# Patient Record
Sex: FEMALE | Race: WHITE | Employment: FULL TIME | URBAN - METROPOLITAN AREA
[De-identification: names, ages, dates, MRNs, and addresses within clinical notes are randomized per-mention and may not be internally consistent; named-entity substitution may affect disease eponyms.]

---

## 2018-08-29 ENCOUNTER — OFFICE VISIT (OUTPATIENT)
Dept: NEUROLOGY | Facility: CLINIC | Age: 53
End: 2018-08-29
Payer: COMMERCIAL

## 2018-08-29 VITALS
WEIGHT: 190 LBS | HEART RATE: 90 BPM | HEIGHT: 66 IN | SYSTOLIC BLOOD PRESSURE: 124 MMHG | BODY MASS INDEX: 30.53 KG/M2 | RESPIRATION RATE: 16 BRPM | DIASTOLIC BLOOD PRESSURE: 76 MMHG

## 2018-08-29 DIAGNOSIS — M51.9 LUMBAR DISC DISEASE: ICD-10-CM

## 2018-08-29 DIAGNOSIS — M48.061 SPINAL STENOSIS OF LUMBAR REGION WITHOUT NEUROGENIC CLAUDICATION: ICD-10-CM

## 2018-08-29 DIAGNOSIS — M54.16 LUMBAR RADICULOPATHY: Primary | ICD-10-CM

## 2018-08-29 DIAGNOSIS — M54.50 ACUTE BILATERAL LOW BACK PAIN WITHOUT SCIATICA: ICD-10-CM

## 2018-08-29 DIAGNOSIS — M51.26 LUMBAR HERNIATED DISC: ICD-10-CM

## 2018-08-29 PROCEDURE — 99214 OFFICE O/P EST MOD 30 MIN: CPT | Performed by: PHYSICAL MEDICINE & REHABILITATION

## 2018-08-29 RX ORDER — GLIPIZIDE 10 MG/1
20 TABLET, FILM COATED, EXTENDED RELEASE ORAL DAILY
COMMUNITY
End: 2019-01-21

## 2018-08-29 NOTE — PROGRESS NOTES
Low Back Pain H & P    Chief Complaint: Patient presents with:  Low Back Pain: Patient presents for low back pain, LOV: 6/23/16. Pt c/o low back pain and soreness for the past 4-6 months, pain worsens when walking up stairs.  Rated pain a 5/10, takes talia Main Topics   Smoking status: Former Smoker     Types: Cigarettes    Smokeless tobacco: Never Used    Comment: 30 years ago    Alcohol use Yes  0.0 oz/week     Comment: occ    Drug use: No    Sexual activity: Not on file     Other Topics Concern    Tim Trochanteric Bursa     Vascular lower extremity:   Dorsalis pedis pulse-RIGHT 2+   Dorsalis pedis pulse-LEFT 2+   Tibialis posterior pulse-RIGHT 2+   Tibialis posterior pulse-LEFT 2+     Neurological Lower Extremity:    Light Touch Sensation: Intact in miguel a

## 2018-08-29 NOTE — PATIENT INSTRUCTIONS
As of October 6th 2014, the Drug Enforcement Agency [facility]) is reclassifying all hydrocodone combination medications from Schedule III to Schedule II. This includes medications such as Norco, Vicodin, Lortab, Zohydro, and Vicoprofen.     What this means for y will get lumbar spine flexion and extension x-rays. She will start PT on her lumbar spine. The patient does not need any pain medications at this time. She will call me in ne month to let me know how she is doing.     If she is not getting better w

## 2018-09-07 ENCOUNTER — TELEPHONE (OUTPATIENT)
Dept: NEUROLOGY | Facility: CLINIC | Age: 53
End: 2018-09-07

## 2018-09-07 DIAGNOSIS — M54.50 ACUTE RIGHT-SIDED LOW BACK PAIN WITHOUT SCIATICA: ICD-10-CM

## 2018-09-07 DIAGNOSIS — M51.9 LUMBAR DISC DISEASE: ICD-10-CM

## 2018-09-07 DIAGNOSIS — M51.26 LUMBAR HERNIATED DISC: ICD-10-CM

## 2018-09-07 DIAGNOSIS — M48.061 SPINAL STENOSIS OF LUMBAR REGION WITHOUT NEUROGENIC CLAUDICATION: ICD-10-CM

## 2018-09-07 DIAGNOSIS — M54.16 LUMBAR RADICULOPATHY: Primary | ICD-10-CM

## 2018-09-07 NOTE — TELEPHONE ENCOUNTER
XR orders have been placed per LOV 8/29/18. Left detailed message for patient informing her orders have been placed and an appointment is not needed to complete the imaging.

## 2018-09-14 ENCOUNTER — OFFICE VISIT (OUTPATIENT)
Dept: PHYSICAL THERAPY | Facility: HOSPITAL | Age: 53
End: 2018-09-14
Attending: PHYSICAL MEDICINE & REHABILITATION
Payer: COMMERCIAL

## 2018-09-14 DIAGNOSIS — M48.061 SPINAL STENOSIS OF LUMBAR REGION WITHOUT NEUROGENIC CLAUDICATION: ICD-10-CM

## 2018-09-14 DIAGNOSIS — M51.26 LUMBAR HERNIATED DISC: ICD-10-CM

## 2018-09-14 DIAGNOSIS — M54.50 ACUTE BILATERAL LOW BACK PAIN WITHOUT SCIATICA: ICD-10-CM

## 2018-09-14 DIAGNOSIS — M51.9 LUMBAR DISC DISEASE: ICD-10-CM

## 2018-09-14 DIAGNOSIS — M54.16 LUMBAR RADICULOPATHY: ICD-10-CM

## 2018-09-14 PROCEDURE — 97140 MANUAL THERAPY 1/> REGIONS: CPT

## 2018-09-14 PROCEDURE — 97161 PT EVAL LOW COMPLEX 20 MIN: CPT

## 2018-09-14 NOTE — PROGRESS NOTES
LUMBAR SPINE EVALUATION:   Referring Physician: Dr. Dona Banda  Date of Onset: March 2018 Date of Service: 9/14/2018   Diagnosis: Lumbar radiculopathy (M54.16)  Acute bilateral low back pain without sciatica (M54.5)  Lumbar herniated disc (M51.26)  Lumbar di 50% limited          LBP on the way up   Extension WNL hinging L3-5    R Sidebend Limited 25% LBP   L Sidebend WNL    R Rotation WNL    L Rotation Limited 50% LBP   R Sideglide     L Sideglide       STRENGTH:   -/5     Right Left Comments   Hip Flexion (L2 excellent    Patient was advised of these findings, precautions, and treatment options and has agreed to actively participate in planning and for this course of care.     Thank you for your referral. Please co-sign or sign and return this letter via fax as

## 2018-09-21 ENCOUNTER — OFFICE VISIT (OUTPATIENT)
Dept: PHYSICAL THERAPY | Facility: HOSPITAL | Age: 53
End: 2018-09-21
Attending: PHYSICAL MEDICINE & REHABILITATION
Payer: COMMERCIAL

## 2018-09-21 PROCEDURE — 97110 THERAPEUTIC EXERCISES: CPT

## 2018-09-21 PROCEDURE — 97140 MANUAL THERAPY 1/> REGIONS: CPT

## 2018-09-21 NOTE — PROGRESS NOTES
Diagnosis: Lumbar radiculopathy (M54.16)  Acute bilateral low back pain without sciatica (M54.5)  Lumbar herniated disc (M51.26)  Lumbar disc disease (M51.9)  Spinal stenosis of lumbar region without neurogenic claudication (M48.061)  Authorized # of Visit closure of L SIJ. Skilled Services: manual therapy for spinal mobility, therex for core and proximal hip strength.   Charges: MT 1, EX 2       Total Timed Treatment: MT 15 min, EX 25 min  Total Treatment Time: 43 min

## 2018-09-25 ENCOUNTER — OFFICE VISIT (OUTPATIENT)
Dept: PHYSICAL THERAPY | Facility: HOSPITAL | Age: 53
End: 2018-09-25
Attending: PHYSICAL MEDICINE & REHABILITATION
Payer: COMMERCIAL

## 2018-09-25 PROCEDURE — 97110 THERAPEUTIC EXERCISES: CPT

## 2018-09-25 PROCEDURE — 97140 MANUAL THERAPY 1/> REGIONS: CPT

## 2018-09-25 NOTE — PROGRESS NOTES
Diagnosis: Lumbar radiculopathy (M54.16)  Acute bilateral low back pain without sciatica (M54.5)  Lumbar herniated disc (M51.26)  Lumbar disc disease (M51.9)  Spinal stenosis of lumbar region without neurogenic claudication (M48.061)  Authorized # of Visit able to bend FWD pain-free to pick objects off the ground to tidy up her home. Be able to demonstrate proper body mechanics.     Plan: Continue manual therapy to restore lumbosacral alignment and spinal mobility and incorporate core and proximal hip streng

## 2018-09-28 ENCOUNTER — OFFICE VISIT (OUTPATIENT)
Dept: PHYSICAL THERAPY | Facility: HOSPITAL | Age: 53
End: 2018-09-28
Attending: PHYSICAL MEDICINE & REHABILITATION
Payer: COMMERCIAL

## 2018-09-28 PROCEDURE — 97110 THERAPEUTIC EXERCISES: CPT

## 2018-09-28 PROCEDURE — 97140 MANUAL THERAPY 1/> REGIONS: CPT

## 2018-09-28 NOTE — PROGRESS NOTES
Diagnosis: Lumbar radiculopathy (M54.16)  Acute bilateral low back pain without sciatica (M54.5)  Lumbar herniated disc (M51.26)  Lumbar disc disease (M51.9)  Spinal stenosis of lumbar region without neurogenic claudication (M48.061)  Authorized # of Visit standing hip abd added. Assessment: Able to progress to prone hip ext with VCs to avoid hamstring dominance. No longer presented with SIJ dysfunction. Need to continue to build up posterior sling stability with glut strengthening.     Goals: 6 wks  Be a

## 2018-10-02 ENCOUNTER — OFFICE VISIT (OUTPATIENT)
Dept: PHYSICAL THERAPY | Facility: HOSPITAL | Age: 53
End: 2018-10-02
Attending: PHYSICAL MEDICINE & REHABILITATION
Payer: COMMERCIAL

## 2018-10-02 PROCEDURE — 97140 MANUAL THERAPY 1/> REGIONS: CPT

## 2018-10-02 PROCEDURE — 97110 THERAPEUTIC EXERCISES: CPT

## 2018-10-02 NOTE — PROGRESS NOTES
Diagnosis: Lumbar radiculopathy (M54.16)  Acute bilateral low back pain without sciatica (M54.5)  Lumbar herniated disc (M51.26)  Lumbar disc disease (M51.9)  Spinal stenosis of lumbar region without neurogenic claudication (M48.061)  Authorized # of Visit clams with TA brace bilat hold 5 sec x15  -EOB prone hip ext 2x10 hold 3 sec  -Standing hip abd YTB 3x10 bilat  -Tandem stance pull down GTB x15 hold 3 se   HEP - handout provided and created for open books, clams, and glut squeezes  HEP prone hip ext and

## 2018-10-05 ENCOUNTER — APPOINTMENT (OUTPATIENT)
Dept: PHYSICAL THERAPY | Facility: HOSPITAL | Age: 53
End: 2018-10-05
Attending: PHYSICAL MEDICINE & REHABILITATION
Payer: COMMERCIAL

## 2018-10-09 ENCOUNTER — OFFICE VISIT (OUTPATIENT)
Dept: PHYSICAL THERAPY | Facility: HOSPITAL | Age: 53
End: 2018-10-09
Attending: PHYSICAL MEDICINE & REHABILITATION
Payer: COMMERCIAL

## 2018-10-09 PROCEDURE — 97110 THERAPEUTIC EXERCISES: CPT

## 2018-10-09 PROCEDURE — 97140 MANUAL THERAPY 1/> REGIONS: CPT

## 2018-10-09 NOTE — PROGRESS NOTES
Diagnosis: Lumbar radiculopathy (M54.16)  Acute bilateral low back pain without sciatica (M54.5)  Lumbar herniated disc (M51.26)  Lumbar disc disease (M51.9)  Spinal stenosis of lumbar region without neurogenic claudication (M48.061)  Authorized # of Visit hold 5 sec  -open books x15 hold 3 sec  -S/L clams with TA brace bilat hold 5 sec x15  -prone hip ext 2x10 hold 3 sec  -Standing hip abd YTB 3x10 bilat EX  -PPT x10 hold 10 sec  -frog position PPT x15 hold 5 sec  -S/L clams with TA brace bilat hold 5 sec x

## 2018-10-12 ENCOUNTER — OFFICE VISIT (OUTPATIENT)
Dept: PHYSICAL THERAPY | Facility: HOSPITAL | Age: 53
End: 2018-10-12
Attending: PHYSICAL MEDICINE & REHABILITATION
Payer: COMMERCIAL

## 2018-10-12 PROCEDURE — 97140 MANUAL THERAPY 1/> REGIONS: CPT

## 2018-10-12 PROCEDURE — 97110 THERAPEUTIC EXERCISES: CPT

## 2018-10-12 NOTE — PROGRESS NOTES
Diagnosis: Lumbar radiculopathy (M54.16)  Acute bilateral low back pain without sciatica (M54.5)  Lumbar herniated disc (M51.26)  Lumbar disc disease (M51.9)  Spinal stenosis of lumbar region without neurogenic claudication (M48.061)  Authorized # of Visit hip abd YTB 3x10 bilat EX  -PPT x10 hold 10 sec  -frog position PPT x15 hold 5 sec  -open books x15 hold 3 sec  -S/L clams with TA brace bilat hold 5 sec x15  -prone hip ext 2x10 hold 3 sec  -Standing hip abd YTB 3x10 bilat EX  -PPT x10 hold 10 sec  -frog

## 2018-10-16 ENCOUNTER — OFFICE VISIT (OUTPATIENT)
Dept: PHYSICAL THERAPY | Facility: HOSPITAL | Age: 53
End: 2018-10-16
Attending: PHYSICAL MEDICINE & REHABILITATION
Payer: COMMERCIAL

## 2018-10-16 PROCEDURE — 97140 MANUAL THERAPY 1/> REGIONS: CPT

## 2018-10-16 PROCEDURE — 97110 THERAPEUTIC EXERCISES: CPT

## 2018-10-16 NOTE — PROGRESS NOTES
Diagnosis: Lumbar radiculopathy (M54.16)  Acute bilateral low back pain without sciatica (M54.5)  Lumbar herniated disc (M51.26)  Lumbar disc disease (M51.9)  Spinal stenosis of lumbar region without neurogenic claudication (M48.061)  Authorized # of Visit sec  -frog position PPT x15   -open books x15 hold 3 sec  -S/L clams with TA brace bilat hold 5 sec x15  -prone glut squeeze x15 hold 5 sec  -Standing hip abd YTB 3x10 bilat EX  -PPT x10 hold 10 sec  -frog position PPT x15 hold 5 sec  -open books x15 hold hip and core strengthening 1x/wk for 2 wks then DC. .   Skilled Services: manual therapy for spinal mobility, therex for core and proximal hip strength.   Charges: MT 1, EX 2       Total Timed Treatment: MT 15 min, EX 30 min  Total Treatment Time: 45 min

## 2018-10-23 ENCOUNTER — OFFICE VISIT (OUTPATIENT)
Dept: PHYSICAL THERAPY | Facility: HOSPITAL | Age: 53
End: 2018-10-23
Attending: PHYSICAL MEDICINE & REHABILITATION
Payer: COMMERCIAL

## 2018-10-23 PROCEDURE — 97110 THERAPEUTIC EXERCISES: CPT

## 2018-10-23 PROCEDURE — 97140 MANUAL THERAPY 1/> REGIONS: CPT

## 2018-10-23 NOTE — PROGRESS NOTES
Diagnosis: Lumbar radiculopathy (M54.16)  Acute bilateral low back pain without sciatica (M54.5)  Lumbar herniated disc (M51.26)  Lumbar disc disease (M51.9)  Spinal stenosis of lumbar region without neurogenic claudication (M48.061)  Authorized # of Visit L2-3  -Long axis traction SIJ manip R   EX  -PPT x10 hold 10 sec  -open books x15 hold 3 sec  -S/L clams with TA brace bilat hold 5 sec x15  -prone glut squeeze x15 hold 5 sec EX  -PPT x10 hold 10 sec  -frog position PPT x15   -open books x15 hold 3 sec  - progress to lat eccentric step down for additional functional quad and proximal hip stability for negotiating stairs.      Goals: 6 wks  Be able to negotiate stairs carrying laundry basket pain-free to get around home and work environment. (MET)  Be able to

## 2018-11-01 ENCOUNTER — TELEPHONE (OUTPATIENT)
Dept: PHYSICAL THERAPY | Facility: HOSPITAL | Age: 53
End: 2018-11-01

## 2019-01-21 ENCOUNTER — OFFICE VISIT (OUTPATIENT)
Dept: NEUROLOGY | Facility: CLINIC | Age: 54
End: 2019-01-21
Payer: COMMERCIAL

## 2019-01-21 VITALS — RESPIRATION RATE: 17 BRPM | SYSTOLIC BLOOD PRESSURE: 132 MMHG | DIASTOLIC BLOOD PRESSURE: 84 MMHG | HEART RATE: 94 BPM

## 2019-01-21 DIAGNOSIS — M51.26 LUMBAR HERNIATED DISC: ICD-10-CM

## 2019-01-21 DIAGNOSIS — M51.9 LUMBAR DISC DISEASE: ICD-10-CM

## 2019-01-21 DIAGNOSIS — M48.061 SPINAL STENOSIS OF LUMBAR REGION WITHOUT NEUROGENIC CLAUDICATION: ICD-10-CM

## 2019-01-21 DIAGNOSIS — M54.50 ACUTE RIGHT-SIDED LOW BACK PAIN WITHOUT SCIATICA: ICD-10-CM

## 2019-01-21 DIAGNOSIS — M54.16 LUMBAR RADICULOPATHY: Primary | ICD-10-CM

## 2019-01-21 PROCEDURE — 99214 OFFICE O/P EST MOD 30 MIN: CPT | Performed by: PHYSICAL MEDICINE & REHABILITATION

## 2019-01-21 RX ORDER — BLOOD SUGAR DIAGNOSTIC
STRIP MISCELLANEOUS
COMMUNITY
Start: 2018-10-02

## 2019-01-21 RX ORDER — BLOOD-GLUCOSE METER
EACH MISCELLANEOUS
COMMUNITY
Start: 2018-09-05

## 2019-01-21 RX ORDER — LANCETS 33 GAUGE
EACH MISCELLANEOUS
COMMUNITY
Start: 2018-09-05

## 2019-01-21 RX ORDER — INSULIN GLARGINE 300 U/ML
INJECTION, SOLUTION SUBCUTANEOUS
COMMUNITY
Start: 2018-10-02

## 2019-01-21 NOTE — PROGRESS NOTES
Low Back Pain H & P    Chief Complaint: Patient presents with:  Low Back Pain: LOV 8/29/18 Pt states that she did have PT and it was successful.  she states that walking up the stairs makes it worse and she has a new pain that she thought was worth getting Not on file      Food insecurity - worry: Not on file      Food insecurity - inability: Not on file      Transportation needs - medical: Not on file      Transportation needs - non-medical: Not on file    Occupational History      Not on file    Tobacco Us all Spinous Processes   Z-joints: Non-tender for all Z-joints   SIJ: Non-tender for bilateral SIJ   Piriformis Muscle: Non-tender bilateral Piriformis muscles   Greater Trochanteric Bursa: Non-tender for bilateral Greater Trochanteric Bursa     Vascular lo

## 2019-01-21 NOTE — PATIENT INSTRUCTIONS
Plan  She will resume her home exercise program.    The patient does not need any injections at this time. If the home exercise program is not helping her after 2-3 weeks, then she will need to get back into the PT.     The patient does not need any pa

## 2019-07-01 ENCOUNTER — HOSPITAL ENCOUNTER (OUTPATIENT)
Dept: GENERAL RADIOLOGY | Facility: HOSPITAL | Age: 54
Discharge: HOME | End: 2019-07-01
Attending: ORTHOPAEDIC SURGERY
Payer: COMMERCIAL

## 2019-07-01 ENCOUNTER — HOSPITAL ENCOUNTER (OUTPATIENT)
Dept: MRI IMAGING | Facility: HOSPITAL | Age: 54
Discharge: HOME | End: 2019-07-01
Attending: ORTHOPAEDIC SURGERY
Payer: COMMERCIAL

## 2019-07-01 DIAGNOSIS — M87.9 BONE NECROSIS (HCC): ICD-10-CM

## 2019-07-01 DIAGNOSIS — L03.90 CELLULITIS: ICD-10-CM

## 2019-07-01 DIAGNOSIS — E11.621 CHRONIC DIABETIC ULCER OF LEFT FOOT DETERMINED BY EXAMINATION (HCC): ICD-10-CM

## 2019-07-01 DIAGNOSIS — L97.529 CHRONIC DIABETIC ULCER OF LEFT FOOT DETERMINED BY EXAMINATION (HCC): ICD-10-CM

## 2019-07-01 PROCEDURE — 73718 MRI LOWER EXTREMITY W/O DYE: CPT | Performed by: ORTHOPAEDIC SURGERY

## 2019-07-01 PROCEDURE — 73630 X-RAY EXAM OF FOOT: CPT | Performed by: ORTHOPAEDIC SURGERY

## 2019-08-30 ENCOUNTER — HOSPITAL ENCOUNTER (OUTPATIENT)
Dept: ULTRASOUND IMAGING | Facility: HOSPITAL | Age: 54
Discharge: HOME | End: 2019-08-30
Attending: FAMILY MEDICINE
Payer: COMMERCIAL

## 2019-08-30 DIAGNOSIS — E11.621 DIABETIC ULCER OF LEFT FOOT (HCC): ICD-10-CM

## 2019-08-30 DIAGNOSIS — L97.529 DIABETIC ULCER OF LEFT FOOT (HCC): ICD-10-CM

## 2019-08-30 PROCEDURE — 93923 UPR/LXTR ART STDY 3+ LVLS: CPT | Performed by: FAMILY MEDICINE

## 2022-09-22 ENCOUNTER — OFFICE VISIT (OUTPATIENT)
Dept: PHYSICAL MEDICINE AND REHAB | Facility: CLINIC | Age: 57
End: 2022-09-22

## 2022-09-22 VITALS
SYSTOLIC BLOOD PRESSURE: 106 MMHG | HEART RATE: 109 BPM | WEIGHT: 208 LBS | RESPIRATION RATE: 16 BRPM | DIASTOLIC BLOOD PRESSURE: 72 MMHG | OXYGEN SATURATION: 100 % | HEIGHT: 66 IN | BODY MASS INDEX: 33.43 KG/M2

## 2022-09-22 DIAGNOSIS — G89.29 CHRONIC PAIN OF RIGHT KNEE: ICD-10-CM

## 2022-09-22 DIAGNOSIS — M54.16 LUMBAR RADICULOPATHY: Primary | ICD-10-CM

## 2022-09-22 DIAGNOSIS — M48.061 SPINAL STENOSIS OF LUMBAR REGION WITHOUT NEUROGENIC CLAUDICATION: ICD-10-CM

## 2022-09-22 DIAGNOSIS — M51.24 HERNIATED THORACIC DISC WITHOUT MYELOPATHY: ICD-10-CM

## 2022-09-22 DIAGNOSIS — M51.26 LUMBAR HERNIATED DISC: ICD-10-CM

## 2022-09-22 DIAGNOSIS — M25.561 CHRONIC PAIN OF RIGHT KNEE: ICD-10-CM

## 2022-09-22 DIAGNOSIS — M51.9 LUMBAR DISC DISEASE: ICD-10-CM

## 2022-09-22 DIAGNOSIS — M41.05 INFANTILE IDIOPATHIC SCOLIOSIS OF THORACOLUMBAR REGION: ICD-10-CM

## 2022-09-22 PROCEDURE — 99204 OFFICE O/P NEW MOD 45 MIN: CPT | Performed by: PHYSICAL MEDICINE & REHABILITATION

## 2022-09-22 RX ORDER — DILTIAZEM HYDROCHLORIDE 240 MG/1
240 CAPSULE, EXTENDED RELEASE ORAL NIGHTLY
COMMUNITY
Start: 2022-08-20

## 2022-09-22 RX ORDER — DAPAGLIFLOZIN 5 MG/1
TABLET, FILM COATED ORAL
COMMUNITY
Start: 2022-09-20

## 2022-09-22 RX ORDER — ATORVASTATIN CALCIUM 40 MG/1
40 TABLET, FILM COATED ORAL DAILY
COMMUNITY
Start: 2022-08-04

## 2022-09-22 RX ORDER — PEN NEEDLE, DIABETIC 32GX 5/32"
NEEDLE, DISPOSABLE MISCELLANEOUS
COMMUNITY
Start: 2022-09-06

## 2022-09-22 RX ORDER — LISINOPRIL 20 MG/1
TABLET ORAL
COMMUNITY
Start: 2022-08-03 | End: 2022-09-22 | Stop reason: DRUGHIGH

## 2022-09-22 NOTE — PATIENT INSTRUCTIONS
Plan  She will get into the PT for the lumbar spine and the right knee. I will hold on getting a right knee x-ray at this time. If the knee does not improve with the PT, then she will need to have one at that time. She will follow up in 3 months or sooner if needed.

## 2022-10-28 ENCOUNTER — TELEPHONE (OUTPATIENT)
Dept: PHYSICAL THERAPY | Facility: HOSPITAL | Age: 57
End: 2022-10-28

## 2022-11-07 ENCOUNTER — TELEPHONE (OUTPATIENT)
Dept: PHYSICAL THERAPY | Facility: HOSPITAL | Age: 57
End: 2022-11-07

## 2022-11-08 ENCOUNTER — APPOINTMENT (OUTPATIENT)
Dept: PHYSICAL THERAPY | Facility: HOSPITAL | Age: 57
End: 2022-11-08
Attending: PHYSICAL MEDICINE & REHABILITATION
Payer: COMMERCIAL

## 2022-11-15 ENCOUNTER — OFFICE VISIT (OUTPATIENT)
Dept: PHYSICAL THERAPY | Facility: HOSPITAL | Age: 57
End: 2022-11-15
Attending: PHYSICAL MEDICINE & REHABILITATION
Payer: COMMERCIAL

## 2022-11-15 PROCEDURE — 97110 THERAPEUTIC EXERCISES: CPT

## 2022-11-15 PROCEDURE — 97161 PT EVAL LOW COMPLEX 20 MIN: CPT

## 2022-11-18 ENCOUNTER — APPOINTMENT (OUTPATIENT)
Dept: PHYSICAL THERAPY | Facility: HOSPITAL | Age: 57
End: 2022-11-18
Attending: PHYSICAL MEDICINE & REHABILITATION
Payer: COMMERCIAL

## 2022-11-25 ENCOUNTER — OFFICE VISIT (OUTPATIENT)
Dept: PHYSICAL THERAPY | Facility: HOSPITAL | Age: 57
End: 2022-11-25
Attending: PHYSICAL MEDICINE & REHABILITATION
Payer: COMMERCIAL

## 2022-11-25 PROCEDURE — 97110 THERAPEUTIC EXERCISES: CPT

## 2022-11-25 PROCEDURE — 97112 NEUROMUSCULAR REEDUCATION: CPT

## 2022-11-25 NOTE — PROGRESS NOTES
Sukumar Spann    3/19/1965  Referring Physician:  Buck Blair  Diagnosis: Lumbar radiculopathy (M54.16)  Spinal stenosis of lumbar region without neurogenic claudication (M48.061)  Lumbar disc disease (M51.9)  Lumbar herniated disc (M51.26)  Herniated thoracic disc without myelopathy (M51.24)  Infantile idiopathic scoliosis of thoracolumbar region (M41.05)  Chronic pain of right knee (M25.561,G89.29)    Initial Evaluation Date: 11/15/2022  Date of Surgery: None for this diagnosis    Insurance type and authorized visits: Serena IRENE NA  Plan of care expires: For orders: 2/13/2023; For insurance: 9/22/2023    Precautions/Hx: DM, HTN, HLD    SUBJECTIVE:     Pt reports that she felt straighter after she left last visit and had less pain. She then felt well enough to do gardening and then was sore afterwards. Visit count 2/13/2023 1/8 2/8    Date 11/15/2022 11/25/2022     LBP/ R>L      Pain Range 0-1 to 5/10 0-1 to 4-5/10    Pain Ave 1-2/10 0-1/10       OBJECTIVE:     Treatment performed this date:    Therapeutic Exercise:  Visit #   1/8 2/8   Position Exercise HEP 11/15/2022 11/25/2022. Supine SKC H X X    DKC H X X    LTR H X X    Sciatic nerve glide H X X    Thoracic decompression H  X    T decompress chin tuck H  X    T decomprs scap retract H  X    T decomprs shldr horiz  H  X    T decomprs B shldr flex H  X   Sitting Leg pump nerve glide H X    Neuro Re-ed   X see assessment     Posture       TNE   X beginning level - pacing of activities   H = HEP. Pt given copies of this exercise for home program.  X = Exercises done this date - pt verbalized understanding and demonstrated competence. All exercises done B unless otherwise indicated. Pt advised to discontinue exercises that increase pain and to call or return to therapist to discuss.   Each intervention above is specifically prescribed to address the patients identified impairments, activity limitations, and participation restrictions. ASSESSMENT:     Pt doing well with decreased c/o pain and increased tolerance for activity. Pt educated on mobility and postural deficits w review of imaging to reinforce corrective exercises and positional awareness. Pt educated on disc anatomy and behavior with use of discussion, diagrams, models and demonstration of positioning for neutral control. Pt asked clarifying questions and verbalized understanding of concepts and application. Pt educated on stenosis concepts w review of central canal, foramina, facet arthropathy, ligamentum flavum, disc anatomy and behavior. Pt verbalized understanding. Pt educated on beginning level of therapeutic neuroscience w discussion of importance of pacing of activities especially during healing phase. Pt demonstrates all previously instructed exercises with proficiency. Pt improved w cueing for correction of forward head position w increased kyphosis-lordosis during T decompression. Physical Therapy Goals: From 11/15/2022 to 2/13/2023  - Created with patient input during initial evaluation   1. Pt will be independent in beginning level of HEP for stretching, posture and strengthening. 2. Pt will be able to get in and out of the tub without increased symptoms. 3. Pt will be able to walk for exercise 15 min x 2 without increased symptoms. 4. Pt will be able to stand to have a conversation 10 min without increased symptoms. 5. Pt will be able to lift 25# without increased symptoms. PLAN OF CARE:      Continue PT per original plan for therapeutic exercises, posture retraining, therapeutic activities, manual treatment, neuromuscular reeducation, therapeutic pain neuroscience education, patient education, self care home management, home exercise program, and modalities as needed.     Charges: Lauren Marito   Total Timed treatment: 60 min      Total Treatment Time: 60 min __________________________________________________________________________________________      21st Century Cures Act Notice to Patient: Medical documents like this are made available to patients in the interest of transparency. However, be advised this is a medical document and it is intended as [id number]-qs-faox communication between your medical providers. This medical document may contain abbreviations, assessments, medical data, and results or other terms that are unfamiliar. Medical documents are intended to carry relevant information, facts as evident, and the clinical opinion of the practitioner. As such, this medical document may be written in language that appears blunt or direct. You are encouraged to contact your medical provider and/or [facility] Patient Experience if you have any questions about this medical document.   Objective Initial Evaluation Data 11/15/2022:    Functional Scores 11/15/2022   FOTO primary measure 57       Posture 11/15/2022   Alignment L shoulder elevated, L trunk shift, increased thoracic kyphosis, pendulous abdomen, forward head position, apparent scoliosis; min B genu recurvatum       Body mechanics 11/15/2022   Reaching for personal belongings fair       Sensation 11/15/2022   Dermatomes Light Touch    Proximal medial thigh R (L2)  wnl   Proximal medial thigh L (L2) wnl   Above knee R (L3) wnl   Above knee L (L3) wnl   Medial arch R(L4) wnl   Medial arch L (L4) wnl   Between 1st - 2nd toes R (L5) wnl   Between 1st - 2nd toes L (L5) wnl   Lateral border of foot R (S1) wnl   Lateral border of foot L (S1) wnl   Popliteal fossa R (S2) wnl   Popliteal fossa L(S2) wnl       Lumbopelvic 11/15/2022   Control  Mod loss        ROM Lumbar 11/15/2022   Flexion Min-mod   Extension Wnl, all lower lumbar, mod-max loss T   R SB Min, all lower T   L SB Min, all lower T   R Rotation min   L Rotation min   * pain limiting    ROM Hip 11/15/2022   Flex R 125 117   Flex L 125 115   ER R 45 45   ER L 45 45   IR R 40 0   IR L 40 7   *pain limiting     MMT 5/5 11/15/2022   L2- hip flex R 4-   Hip flex L 4-   L3- knee ext R 5   Knee ext L 4   L4- ankle DF R 4   Ankle DF L 4   L5- EHL R 4   EHL L 3   S1- ankle PF R 4   Ankle PF L 4   S2- Hams R 4+   Hams L 5-   *pain limiting    LE flexibility 11/15/2022   Piriformis L wnl   Piriformis R wnl   Hams L -27   Hams R -20   *pain limiting    Neural mobility 11/15/2022   SLR R 65 myofascial tension only   SLR L 45 myofascial tension only      JUJU 11/15/2022   R mod   L Min-mod

## 2022-11-29 ENCOUNTER — OFFICE VISIT (OUTPATIENT)
Dept: PHYSICAL THERAPY | Facility: HOSPITAL | Age: 57
End: 2022-11-29
Attending: PHYSICAL MEDICINE & REHABILITATION
Payer: COMMERCIAL

## 2022-11-29 PROCEDURE — 97110 THERAPEUTIC EXERCISES: CPT

## 2022-11-29 PROCEDURE — 97112 NEUROMUSCULAR REEDUCATION: CPT

## 2022-11-29 PROCEDURE — 97140 MANUAL THERAPY 1/> REGIONS: CPT

## 2022-11-29 NOTE — PROGRESS NOTES
Krunal Brandt    3/19/1965  Referring Physician:  Oly Blair  Diagnosis: Lumbar radiculopathy (M54.16)  Spinal stenosis of lum'bar region without neurogenic claudication (M48.061)  Lumbar disc disease (M51.9)  Lumbar herniated disc (M51.26)  Herniated thoracic disc without myelopathy (M51.24)  Infantile idiopathic scoliosis of thoracolumbar region (M41.05)  Chronic pain of right knee (M25.561,G89.29)    Initial Evaluation Date: 11/15/2022  Date of Surgery: None for this diagnosis    Insurance type and authorized visits: JAMISON Armstrong  Plan of care expires: For orders: 2/13/2023; For insurance: 9/22/2023    Precautions/Hx: DM, HTN, HLD    SUBJECTIVE:     Pt reports that she is feeling less tight. Visit count 2/13/2023 1/8 2/8 3/8   Date 11/15/2022 11/25/2022  11/29/2022    LBP/ R>L      Pain Range 0-1 to 5/10 0-1 to 4-5/10 0-1 to 4-5/10   Pain Ave 1-2/10 0-1/10 0-1/10      OBJECTIVE:     Treatment performed this date:    Therapeutic Exercise:  Visit #   1/8 2/8 3/8   Position Exercise HEP 11/15/2022 11/25/2022.  11/29/2022    Supine SKC H X X     DKC H X X     LTR H X X     Sciatic nerve glide H X X     Thoracic decompression H  X     T decompress chin tuck H  X     T decomprs scap retract H  X     T decomprs shldr horiz  H  X     T decomprs B shldr flex H  X     TA set w exhale    X           Foam roll Balancing     X    Chin tuck axial elongation    X    Horiz abd w median nerve glide    X    B shldr abd josefina wings    X    B shldr flex axial elongation    X   Sitting Leg pump nerve glide H X      TA set w exhale H   X    Neuro Re-ed   X see assessment  X see assessment     Posture        TNE   X beginning level - pacing of activities    Man Tx Brachial chain -  upper and lower, B combo upper/lower w hold and soft diaphragmatic breathing     X   H = HEP. Pt given copies of this exercise for home program.  X = Exercises done this date - pt verbalized understanding and demonstrated competence. All exercises done B unless otherwise indicated. Pt advised to discontinue exercises that increase pain and to call or return to therapist to discuss. Each intervention above is specifically prescribed to address the patients identified impairments, activity limitations, and participation restrictions. ASSESSMENT:     Pt continues to do well with low level of pain and good tolerance for PT and HEP thus far. Pt demonstrating improved postural awareness and control in sitting posture. Pt now able to lie flat and on foam roll without support under head. Pt demonstrates mod restriction of L lower rib and min-mod of other quadrants. Pt displays general lower muscle tone globally. Discussed development of awareness of postural neutral w tonic muscle tone to hold for function. Pt able to progress lumbopelvic control awareness well with min V/T cues and self palp. Pt tolerate brachial chain mobilization well demonstrating min-mod losses in mobility. Physical Therapy Goals: From 11/15/2022 to 2/13/2023  - Created with patient input during initial evaluation   1. Pt will be independent in beginning level of HEP for stretching, posture and strengthening. 2. Pt will be able to get in and out of the tub without increased symptoms. 3. Pt will be able to walk for exercise 15 min x 2 without increased symptoms. 4. Pt will be able to stand to have a conversation 10 min without increased symptoms. 5. Pt will be able to lift 25# without increased symptoms. PLAN OF CARE:      Continue PT per original plan for therapeutic exercises, posture retraining, therapeutic activities, manual treatment, neuromuscular reeducation, therapeutic pain neuroscience education, patient education, self care home management, home exercise program, and modalities as needed.     Charges: TE1, Neuro1, MT1   Total Timed treatment: 40 min      Total Treatment Time: 40 min __________________________________________________________________________________________      21st Century Cures Act Notice to Patient: Medical documents like this are made available to patients in the interest of transparency. However, be advised this is a medical document and it is intended as [id number] communication between your medical providers. This medical document may contain abbreviations, assessments, medical data, and results or other terms that are unfamiliar. Medical documents are intended to carry relevant information, facts as evident, and the clinical opinion of the practitioner. As such, this medical document may be written in language that appears blunt or direct. You are encouraged to contact your medical provider and/or [facility] Patient Experience if you have any questions about this medical document.   Objective Initial Evaluation Data 11/15/2022:    Functional Scores 11/15/2022   FOTO primary measure 57       Posture 11/15/2022   Alignment L shoulder elevated, L trunk shift, increased thoracic kyphosis, pendulous abdomen, forward head position, apparent scoliosis; min B genu recurvatum       Body mechanics 11/15/2022   Reaching for personal belongings fair       Sensation 11/15/2022   Dermatomes Light Touch    Proximal medial thigh R (L2)  wnl   Proximal medial thigh L (L2) wnl   Above knee R (L3) wnl   Above knee L (L3) wnl   Medial arch R(L4) wnl   Medial arch L (L4) wnl   Between 1st - 2nd toes R (L5) wnl   Between 1st - 2nd toes L (L5) wnl   Lateral border of foot R (S1) wnl   Lateral border of foot L (S1) wnl   Popliteal fossa R (S2) wnl   Popliteal fossa L(S2) wnl       Lumbopelvic 11/15/2022   Control  Mod loss        ROM Lumbar 11/15/2022   Flexion Min-mod   Extension Wnl, all lower lumbar, mod-max loss T   R SB Min, all lower T   L SB Min, all lower T   R Rotation min   L Rotation min   * pain limiting    ROM Hip 11/15/2022   Flex R 125 117   Flex L 125 115   ER R 45 45   ER L 45 45   IR R 40 0   IR L 40 7   *pain limiting     MMT 5/5 11/15/2022   L2- hip flex R 4-   Hip flex L 4-   L3- knee ext R 5   Knee ext L 4   L4- ankle DF R 4   Ankle DF L 4   L5- EHL R 4   EHL L 3   S1- ankle PF R 4   Ankle PF L 4   S2- Hams R 4+   Hams L 5-   *pain limiting    LE flexibility 11/15/2022   Piriformis L wnl   Piriformis R wnl   Hams L -27   Hams R -20   *pain limiting    Neural mobility 11/15/2022   SLR R 65 myofascial tension only   SLR L 45 myofascial tension only      JUJU 11/15/2022   R mod   L Min-mod

## 2022-12-01 ENCOUNTER — APPOINTMENT (OUTPATIENT)
Dept: PHYSICAL THERAPY | Facility: HOSPITAL | Age: 57
End: 2022-12-01
Attending: PHYSICAL MEDICINE & REHABILITATION
Payer: COMMERCIAL

## 2022-12-06 ENCOUNTER — OFFICE VISIT (OUTPATIENT)
Dept: PHYSICAL THERAPY | Facility: HOSPITAL | Age: 57
End: 2022-12-06
Attending: PHYSICAL MEDICINE & REHABILITATION
Payer: COMMERCIAL

## 2022-12-06 PROCEDURE — 97110 THERAPEUTIC EXERCISES: CPT

## 2022-12-06 PROCEDURE — 97112 NEUROMUSCULAR REEDUCATION: CPT

## 2022-12-06 NOTE — PROGRESS NOTES
Author Bhardwaj    3/19/1965  Referring Physician:  Soumya Blair  Diagnosis: Lumbar radiculopathy (M54.16)  Spinal stenosis of lumbar region without neurogenic claudication (M48.061)  Lumbar disc disease (M51.9)  Lumbar herniated disc (M51.26)  Herniated thoracic disc without myelopathy (M51.24)  Infantile idiopathic scoliosis of thoracolumbar region (M41.05)  Chronic pain of right knee (M25.561,G89.29)    Initial Evaluation Date: 11/15/2022  Date of Surgery: None for this diagnosis    Insurance type and authorized visits: Anastacio IRENE, NA  Plan of care expires: For orders: 2/13/2023; For insurance: 9/22/2023    Precautions/Hx: DM, HTN, HLD    SUBJECTIVE:     Pt reports that she was able to move 70 boxes for her 's dialysis and felt that she was able to manage her back pain well with her exercises.      Visit count 2/13/2023 1/8 2/8 3/8 4/8   Date 11/15/2022 11/25/2022  11/29/2022  12/6/2022    LBP/ R>L       Pain Range 0-1 to 5/10 0-1 to 4-5/10 0-1 to 4-5/10 0-1 to 4/10   Pain Ave 1-2/10 0-1/10 0-1/10 0-1/10      OBJECTIVE:     Treatment performed this date:    Therapeutic Exercise:  Visit #   2/8 3/8 4/8   Position Exercise HEP 11/25/2022.  11/29/2022 12/6/2022    Supine SKC H X      DKC H X      LTR H X      Sciatic nerve glide H X      Thoracic decompression H X      T decompress chin tuck H X      T decomprs scap retract H X      T decomprs shldr horiz  H X      T decomprs B shldr flex H X      TA set w exhale   X     Pelvic tilt    X    CHON T8 extn TA exhale ribs down H   X   Foam roll Balancing    X X    Chin tuck axial elongation   X X    Horiz abd w median nerve glide   X X    B shldr abd josefina wings   X X    B shldr flex axial elongation   X X    TA set exhale ribs down    X   Sitting Leg pump nerve glide H       TA set w exhale H  X     Neuro Re-ed  X see assessment  X see assessment  X see assessment     Posture        TNE  X beginning level - pacing of activities     Man Tx Brachial chain -  upper and lower, B combo upper/lower w hold and soft diaphragmatic breathing    X    H = HEP. Pt given copies of this exercise for home program.  X = Exercises done this date - pt verbalized understanding and demonstrated competence. All exercises done B unless otherwise indicated. Pt advised to discontinue exercises that increase pain and to call or return to therapist to discuss. Each intervention above is specifically prescribed to address the patients identified impairments, activity limitations, and participation restrictions. ASSESSMENT:     Pt continues to have decreased pain with good function. Pt educated on importance of coordination of core activation w breath control. Viewed chest wall flare positioning in mirror while on foam roll. Discussed gaining awareness of abdominal activation without substitution of thoracic flexion. Pt educated on the importance of correct diaphragm control and positioning to gain core abdominal control and neutral lumbopelvic and cervicothoracic alignment. Pt verbalized understanding. Pt did well to progress lumbopelvic control w breath coordination. Physical Therapy Goals: From 11/15/2022 to 2/13/2023  - Created with patient input during initial evaluation   1. Pt will be independent in beginning level of HEP for stretching, posture and strengthening. 2. Pt will be able to get in and out of the tub without increased symptoms. 3. Pt will be able to walk for exercise 15 min x 2 without increased symptoms. 4. Pt will be able to stand to have a conversation 10 min without increased symptoms. 5. Pt will be able to lift 25# without increased symptoms. PLAN OF CARE:      Assess response to beginning level abdominal activation w breath control.   Continue PT per original plan for therapeutic exercises, posture retraining, therapeutic activities, manual treatment, neuromuscular reeducation, therapeutic pain neuroscience education, patient education, self care home management, home exercise program, and modalities as needed. Charges: TE2, Neuro1   Total Timed treatment: 40 min      Total Treatment Time: 40 min     __________________________________________________________________________________________      21st Century Cures Act Notice to Patient: Medical documents like this are made available to patients in the interest of transparency. However, be advised this is a medical document and it is intended as vfld-uv-fisu communication between your medical providers. This medical document may contain abbreviations, assessments, medical data, and results or other terms that are unfamiliar. Medical documents are intended to carry relevant information, facts as evident, and the clinical opinion of the practitioner. As such, this medical document may be written in language that appears blunt or direct. You are encouraged to contact your medical provider and/or [facility] Patient Experience if you have any questions about this medical document.   Objective Initial Evaluation Data 11/15/2022:    Functional Scores 11/15/2022   FOTO primary measure 57       Posture 11/15/2022   Alignment L shoulder elevated, L trunk shift, increased thoracic kyphosis, pendulous abdomen, forward head position, apparent scoliosis; min B genu recurvatum       Body mechanics 11/15/2022   Reaching for personal belongings fair       Sensation 11/15/2022   Dermatomes Light Touch    Proximal medial thigh R (L2)  wnl   Proximal medial thigh L (L2) wnl   Above knee R (L3) wnl   Above knee L (L3) wnl   Medial arch R(L4) wnl   Medial arch L (L4) wnl   Between 1st - 2nd toes R (L5) wnl   Between 1st - 2nd toes L (L5) wnl   Lateral border of foot R (S1) wnl   Lateral border of foot L (S1) wnl   Popliteal fossa R (S2) wnl   Popliteal fossa L(S2) wnl       Lumbopelvic 11/15/2022   Control  Mod loss        ROM Lumbar 11/15/2022   Flexion Min-mod   Extension Wnl, all lower lumbar, mod-max loss T   R SB Min, all lower T   L SB Min, all lower T   R Rotation min   L Rotation min   * pain limiting    ROM Hip 11/15/2022   Flex R 125 117   Flex L 125 115   ER R 45 45   ER L 45 45   IR R 40 0   IR L 40 7   *pain limiting     MMT 5/5 11/15/2022   L2- hip flex R 4-   Hip flex L 4-   L3- knee ext R 5   Knee ext L 4   L4- ankle DF R 4   Ankle DF L 4   L5- EHL R 4   EHL L 3   S1- ankle PF R 4   Ankle PF L 4   S2- Hams R 4+   Hams L 5-   *pain limiting    LE flexibility 11/15/2022   Piriformis L wnl   Piriformis R wnl   Hams L -27   Hams R -20   *pain limiting    Neural mobility 11/15/2022   SLR R 65 myofascial tension only   SLR L 45 myofascial tension only      JUJU 11/15/2022   R mod   L Min-mod

## 2022-12-15 ENCOUNTER — OFFICE VISIT (OUTPATIENT)
Dept: PHYSICAL THERAPY | Facility: HOSPITAL | Age: 57
End: 2022-12-15
Attending: PHYSICAL MEDICINE & REHABILITATION
Payer: COMMERCIAL

## 2022-12-15 PROCEDURE — 97112 NEUROMUSCULAR REEDUCATION: CPT

## 2022-12-15 PROCEDURE — 97110 THERAPEUTIC EXERCISES: CPT

## 2022-12-15 NOTE — PROGRESS NOTES
Dana Reynolds    3/19/1965  Referring Physician:  Kristen Blair  Diagnosis: Lumbar radiculopathy (M54.16)  Spinal stenosis of lumbar region without neurogenic claudication (M48.061)  Lumbar disc disease (M51.9)  Lumbar herniated disc (M51.26)  Herniated thoracic disc without myelopathy (M51.24)  Infantile idiopathic scoliosis of thoracolumbar region (M41.05)  Chronic pain of right knee (M25.561,G89.29)    Initial Evaluation Date: 11/15/2022  Date of Surgery: None for this diagnosis    Insurance type and authorized visits: David IRENE, NA  Plan of care expires: For orders: 2/13/2023; For insurance: 9/22/2023    Precautions/Hx: DM, HTN, HLD    SUBJECTIVE:     Pt reports that she tripped and feel on her L knee where she has had a meniscal tear. She also hit her L elbow and cheek. She has recovered well after a few days of soreness. She had a mild increase in her back pain that she was able to resolve with her HEP. She is also back to work full-time.      Visit count 2/13/2023 1/8 2/8 3/8 4/8 5/8   Date 11/15/2022 11/25/2022  11/29/2022  12/6/2022  12/15/2022    LBP/ R>L        Pain Range 0-1 to 5/10 0-1 to 4-5/10 0-1 to 4-5/10 0-1 to 4/10 0-1 to 5 /10   Pain Ave 1-2/10 0-1/10 0-1/10 0-1/10 1-2/10      OBJECTIVE:     Treatment performed this date:    Therapeutic Exercise:  Visit #   3/8 4/8 5/8   Position Exercise HEP 11/29/2022  12/6/2022  12/15/2022    Supine SKC H       DKC H       LTR H       Sciatic nerve glide H       Thoracic decompression H       T decompress chin tuck H       T decomprs scap retract H       T decomprs shldr horiz  H       T decomprs B shldr flex H       TA set w exhale  X  X     Pelvic tilt   X     P tilt TA set exhale ball hip add    X 10x2 heavy cues    CHON T8 extn TA exhale ribs down H  X X 10x   Foam roll Balancing   X X     Chin tuck axial elongation  X X     Horiz abd w median nerve glide  X X     B shldr abd josefina wings  X X     B shldr flex axial elongation  X X     TA set exhale ribs down   X    Sitting Leg pump nerve glide H   x    TA set w exhale H X   x   Standing TB shldr extn pull down trunk flex        CHON  Doorway TA modified lift H   X 5x   Neuro Re-ed  X see assessment  X see assessment  X see assessment     Posture        TNE       Man Tx Brachial chain -  upper and lower, B combo upper/lower w hold and soft diaphragmatic breathing   X     H = HEP. Pt given copies of this exercise for home program.  X = Exercises done this date - pt verbalized understanding and demonstrated competence. All exercises done B unless otherwise indicated. Pt advised to discontinue exercises that increase pain and to call or return to therapist to discuss. Each intervention above is specifically prescribed to address the patients identified impairments, activity limitations, and participation restrictions. ASSESSMENT:     Pt had a fall which increased her LBP briefly. She was able to control her pain with her HEP. Pt educated on neurological effects of fall and large sensory input. Pt demonstrates mild hip extn and hip abd weakness. Pt educated on importance of hip control. Pt demonstrates overfiring of T-L ps and was well challenged to gain p tilt in supine w exhale without T-L activation or bridging hip lift. Pt remains in chest wall flare. Demonstrating improved ability to activate abdominals and lower chest wall. Physical Therapy Goals: From 11/15/2022 to 2/13/2023  - Created with patient input during initial evaluation   1. Pt will be independent in beginning level of HEP for stretching, posture and strengthening. 2. Pt will be able to get in and out of the tub without increased symptoms. 3. Pt will be able to walk for exercise 15 min x 2 without increased symptoms. 4. Pt will be able to stand to have a conversation 10 min without increased symptoms. 5. Pt will be able to lift 25# without increased symptoms. PLAN OF CARE:      Assess response to CHON.   Continue PT per original plan for therapeutic exercises, posture retraining, therapeutic activities, manual treatment, neuromuscular reeducation, therapeutic pain neuroscience education, patient education, self care home management, home exercise program, and modalities as needed. Charges: Gloria Yost   Total Timed treatment: 60 min      Total Treatment Time: 60 min     __________________________________________________________________________________________      21st Century Cures Act Notice to Patient: Medical documents like this are made available to patients in the interest of transparency. However, be advised this is a medical document and it is intended as ycqy-ne-wvpf communication between your medical providers. This medical document may contain abbreviations, assessments, medical data, and results or other terms that are unfamiliar. Medical documents are intended to carry relevant information, facts as evident, and the clinical opinion of the practitioner. As such, this medical document may be written in language that appears blunt or direct. You are encouraged to contact your medical provider and/or [facility] Patient Experience if you have any questions about this medical document.   Objective Initial Evaluation Data 11/15/2022:    Functional Scores 11/15/2022   FOTO primary measure 57       Posture 11/15/2022   Alignment L shoulder elevated, L trunk shift, increased thoracic kyphosis, pendulous abdomen, forward head position, apparent scoliosis; min B genu recurvatum       Body mechanics 11/15/2022   Reaching for personal belongings fair       Sensation 11/15/2022   Dermatomes Light Touch    Proximal medial thigh R (L2)  wnl   Proximal medial thigh L (L2) wnl   Above knee R (L3) wnl   Above knee L (L3) wnl   Medial arch R(L4) wnl   Medial arch L (L4) wnl   Between 1st - 2nd toes R (L5) wnl   Between 1st - 2nd toes L (L5) wnl   Lateral border of foot R (S1) wnl   Lateral border of foot L (S1) wnl Popliteal fossa R (S2) wnl   Popliteal fossa L(S2) wnl       Lumbopelvic 11/15/2022   Control  Mod loss        ROM Lumbar 11/15/2022   Flexion Min-mod   Extension Wnl, all lower lumbar, mod-max loss T   R SB Min, all lower T   L SB Min, all lower T   R Rotation min   L Rotation min   * pain limiting    ROM Hip 11/15/2022   Flex R 125 117   Flex L 125 115   ER R 45 45   ER L 45 45   IR R 40 0   IR L 40 7   *pain limiting     MMT 5/5 11/15/2022   L2- hip flex R 4-   Hip flex L 4-   L3- knee ext R 5   Knee ext L 4   L4- ankle DF R 4   Ankle DF L 4   L5- EHL R 4   EHL L 3   S1- ankle PF R 4   Ankle PF L 4   S2- Hams R 4+   Hams L 5-        12/15/2022    Hip abd R 5-   Hip abd L 5-   Hip extn R 5-   Hip extn L 4-   *pain limiting    LE flexibility 11/15/2022   Piriformis L wnl   Piriformis R wnl   Hams L -27   Hams R -20   *pain limiting    Neural mobility 11/15/2022   SLR R 65 myofascial tension only   SLR L 45 myofascial tension only      JUJU 11/15/2022   R mod   L Min-mod      Diastasis in fingers 12/15/2022   12 cm above umbilicus 1   6 cm above umbilicus 1   At umbilicus 1   6 cm below umbilicus 1-2   12 cm below umbilicus 1

## 2022-12-19 ENCOUNTER — OFFICE VISIT (OUTPATIENT)
Dept: PHYSICAL THERAPY | Facility: HOSPITAL | Age: 57
End: 2022-12-19
Attending: PHYSICAL MEDICINE & REHABILITATION
Payer: COMMERCIAL

## 2022-12-19 PROCEDURE — 97112 NEUROMUSCULAR REEDUCATION: CPT

## 2022-12-19 PROCEDURE — 97110 THERAPEUTIC EXERCISES: CPT

## 2022-12-19 NOTE — PROGRESS NOTES
Татьяна Zaldivar    3/19/1965  Referring Physician:  Domingo Blair  Diagnosis: Lumbar radiculopathy (M54.16)  Spinal stenosis of lumbar region without neurogenic claudication (M48.061)  Lumbar disc disease (M51.9)  Lumbar herniated disc (M51.26)  Herniated thoracic disc without myelopathy (M51.24)  Infantile idiopathic scoliosis of thoracolumbar region (M41.05)  Chronic pain of right knee (M25.561,G89.29)    Initial Evaluation Date: 11/15/2022  Date of Surgery: None for this diagnosis    Insurance type and authorized visits: Danika IRENE, NA  Plan of care expires: For orders: 2/13/2023; For insurance: 9/22/2023    Precautions/Hx: DM, HTN, HLD    SUBJECTIVE:     Pt reports that she worked 12 hours this weekend and just started peritoneal dialysis for her . She was getting woken up with alarms going off so she is very tired. She remains sore in the L knee.       Visit count 2/13/2023 1/8 2/8 3/8 4/8 5/8 6/8   Date 11/15/2022 11/25/2022  11/29/2022  12/6/2022  12/15/2022  12/19/2022    LBP/ R>L         Pain Range 0-1 to 5/10 0-1 to 4-5/10 0-1 to 4-5/10 0-1 to 4/10 0-1 to 5 /10 0-1 to 2-3/10   Pain Ave 1-2/10 0-1/10 0-1/10 0-1/10 1-2/10 1/10      OBJECTIVE:     Treatment performed this date:    Therapeutic Exercise:  Visit #   4/8 5/8 6/8   Position Exercise HEP 12/6/2022  12/15/2022  12/19/2022    Supine SKC H       DKC H       LTR H       Sciatic nerve glide H       Thoracic decompression H       T decompress chin tuck H       T decomprs scap retract H       T decomprs shldr horiz  H       T decomprs B shldr flex H       TA set w exhale   X      Pelvic tilt  X  X    P tilt TA set exhale ball hip add   X 10x2 heavy cues X 10x mod cues    TA skc        CHON T8 extn TA exhale ribs down H X X 10x    Foam roll Balancing   X      Chin tuck axial elongation  X      Horiz abd w median nerve glide  X      B shldr abd josefina wings  X      B shldr flex axial elongation  X      TA set exhale ribs down  X     90-90 Lumbar decompression H   X     TA set w exhale    X 10x    TA set w exhale self palp lower TA    X 10x    TA set w exhale palp TA & ribs H   X 10x   Sitting Leg pump nerve glide H  x     TA set w exhale H  x    Standing TB shldr extn pull down trunk flex        CHON  Doorway TA modified lift H  X 5x    Neuro Re-ed  X see assessment  X see assessment  X see assessment     Posture        TNE       Man Tx Brachial chain -  upper and lower, B combo upper/lower w hold and soft diaphragmatic breathing        H = HEP. Pt given copies of this exercise for home program.  X = Exercises done this date - pt verbalized understanding and demonstrated competence. All exercises done B unless otherwise indicated. Pt advised to discontinue exercises that increase pain and to call or return to therapist to discuss. Each intervention above is specifically prescribed to address the patients identified impairments, activity limitations, and participation restrictions. ASSESSMENT:     Pt instructed in 90-90 positioning for use to gain lumbar decompression and full spinal ps release. Pt able to perform beginning level abdominal core exercises in the position. Instructed in performance at home especially for acute spinal symptoms. Pt verbalized understanding. Pt educated on the anatomy and importance of transverse abdominis, thoracodorsal fascia to lumbar stability. Pt verbalized understanding. Pt progressed well in ability to activate core abdominals in 90-90 and hooklying. Improved w self palpation. Pt able to jun TA skc without adverse reaction. Physical Therapy Goals: From 11/15/2022 to 2/13/2023  - Created with patient input during initial evaluation   1. Pt will be independent in beginning level of HEP for stretching, posture and strengthening. 2. Pt will be able to get in and out of the tub without increased symptoms. 3. Pt will be able to walk for exercise 15 min x 2 without increased symptoms.   4. Pt will be able to stand to have a conversation 10 min without increased symptoms. 5. Pt will be able to lift 25# without increased symptoms. PLAN OF CARE:      Assess response to use of 90-90 for inhibition of TL overfiring. Continue PT per original plan for therapeutic exercises, posture retraining, therapeutic activities, manual treatment, neuromuscular reeducation, therapeutic pain neuroscience education, patient education, self care home management, home exercise program, and modalities as needed. Charges: Mellody Schirmer   Total Timed treatment: 40 min      Total Treatment Time: 40 min     __________________________________________________________________________________________      21st Century Cures Act Notice to Patient: Medical documents like this are made available to patients in the interest of transparency. However, be advised this is a medical document and it is intended as ldgj-eh-izsu communication between your medical providers. This medical document may contain abbreviations, assessments, medical data, and results or other terms that are unfamiliar. Medical documents are intended to carry relevant information, facts as evident, and the clinical opinion of the practitioner. As such, this medical document may be written in language that appears blunt or direct. You are encouraged to contact your medical provider and/or [facility] 112 Patient Experience if you have any questions about this medical document.   Objective Initial Evaluation Data 11/15/2022:    Functional Scores 11/15/2022   FOTO primary measure 57       Posture 11/15/2022   Alignment L shoulder elevated, L trunk shift, increased thoracic kyphosis, pendulous abdomen, forward head position, apparent scoliosis; min B genu recurvatum       Body mechanics 11/15/2022   Reaching for personal belongings fair       Sensation 11/15/2022   Dermatomes Light Touch    Proximal medial thigh R (L2)  wnl   Proximal medial thigh L (L2) wnl   Above knee R (L3) wnl   Above knee L (L3) wnl   Medial arch R(L4) wnl   Medial arch L (L4) wnl   Between 1st - 2nd toes R (L5) wnl   Between 1st - 2nd toes L (L5) wnl   Lateral border of foot R (S1) wnl   Lateral border of foot L (S1) wnl   Popliteal fossa R (S2) wnl   Popliteal fossa L(S2) wnl       Lumbopelvic 11/15/2022   Control  Mod loss        ROM Lumbar 11/15/2022   Flexion Min-mod   Extension Wnl, all lower lumbar, mod-max loss T   R SB Min, all lower T   L SB Min, all lower T   R Rotation min   L Rotation min   * pain limiting    ROM Hip 11/15/2022   Flex R 125 117   Flex L 125 115   ER R 45 45   ER L 45 45   IR R 40 0   IR L 40 7   *pain limiting     MMT 5/5 11/15/2022   L2- hip flex R 4-   Hip flex L 4-   L3- knee ext R 5   Knee ext L 4   L4- ankle DF R 4   Ankle DF L 4   L5- EHL R 4   EHL L 3   S1- ankle PF R 4   Ankle PF L 4   S2- Hams R 4+   Hams L 5-        12/15/2022    Hip abd R 5-   Hip abd L 5-   Hip extn R 5-   Hip extn L 4-   *pain limiting    LE flexibility 11/15/2022   Piriformis L wnl   Piriformis R wnl   Hams L -27   Hams R -20   *pain limiting    Neural mobility 11/15/2022   SLR R 65 myofascial tension only   SLR L 45 myofascial tension only      JUJU 11/15/2022   R mod   L Min-mod      Diastasis in fingers 12/15/2022   12 cm above umbilicus 1   6 cm above umbilicus 1   At umbilicus 1   6 cm below umbilicus 1-2   12 cm below umbilicus 1

## 2022-12-27 ENCOUNTER — OFFICE VISIT (OUTPATIENT)
Dept: PHYSICAL THERAPY | Facility: HOSPITAL | Age: 57
End: 2022-12-27
Attending: PHYSICAL MEDICINE & REHABILITATION
Payer: COMMERCIAL

## 2022-12-27 PROCEDURE — 97110 THERAPEUTIC EXERCISES: CPT

## 2022-12-27 PROCEDURE — 97112 NEUROMUSCULAR REEDUCATION: CPT

## 2022-12-27 NOTE — PROGRESS NOTES
Shashank [place]    3/19/1965  Referring Physician:  Kristy Blair  Diagnosis: Lumbar radiculopathy (M54.16)  Spinal stenosis of lumbar region without neurogenic claudication (M48.061)  Lumbar disc disease (M51.9)  Lumbar herniated disc (M51.26)  Herniated thoracic disc without myelopathy (M51.24)  Infantile idiopathic scoliosis of thoracolumbar region (M41.05)  Chronic pain of right knee (M25.561,G89.29)    Initial Evaluation Date: 11/15/2022  Date of Surgery: None for this diagnosis    Insurance type and authorized visits: Edith IRENE, NA  Plan of care expires: For orders: 2/13/2023; For insurance: 9/22/2023    Precautions/Hx: DM, HTN, HLD    SUBJECTIVE:     Pt reports that she has been very sleep deprived as her  is still needing help w his peritoneal dialysis frequently at night. She has had minimal pain.      Visit count 2/13/2023 1/8 2/8 3/8 4/8 5/8 6/8 7/8   Date 11/15/2022 11/25/2022  11/29/2022  12/6/2022  12/15/2022  12/19/2022  12/27/2022    LBP/ R>L          Pain Range 0-1 to 5/10 0-1 to 4-5/10 0-1 to 4-5/10 0-1 to 4/10 0-1 to 5 /10 0-1 to 2-3/10 0-1 to 2/10   Pain Ave 1-2/10 0-1/10 0-1/10 0-1/10 1-2/10 1/10 1/10      OBJECTIVE:     Treatment performed this date:    Therapeutic Exercise:  Visit #   5/8 6/8 7/8   Position Exercise HEP 12/15/2022  12/19/2022  12/27/2022    Supine SKC H       DKC H       LTR H       Sciatic nerve glide H   x    Thoracic decompression H   X     T decompress chin tuck H   X     T decomprs scap retract H       T decomprs shldr horiz  H       T decomprs B shldr flex H       TA set w exhale  X   X     Pelvic tilt   X     P tilt TA set exhale ball hip add  X 10x2 heavy cues X 10x mod cues     TA skc        CHON T8 extn TA exhale ribs down H X 10x  X 10x    TA dkc H   X 10x3 cues   Foam roll Balancing         Chin tuck axial elongation        Horiz abd w median nerve glide        B shldr abd josefina wings        B shldr flex axial elongation        TA set exhale ribs down       90-90 Lumbar decompression H  X      TA set w exhale   X 10x     TA set w exhale self palp lower TA   X 10x     TA set w exhale palp TA & ribs H  X 10x    Sitting Leg pump nerve glide H x      TA set w exhale H x     Standing TB shldr extn pull down trunk flex        TB Shl        CHON  Doorway TA modified lift H X 5x     Neuro Re-ed  X see assessment  X see assessment  X see assessment     Posture        TNE       Man Tx Brachial chain -  upper and lower, B combo upper/lower w hold and soft diaphragmatic breathing        H = HEP. Pt given copies of this exercise for home program.  X = Exercises done this date - pt verbalized understanding and demonstrated competence. All exercises done B unless otherwise indicated. Pt advised to discontinue exercises that increase pain and to call or return to therapist to discuss. Each intervention above is specifically prescribed to address the patients identified impairments, activity limitations, and participation restrictions. ASSESSMENT:     Pt educated on the importance of frequent postural correction to maintain gains made in LBP relief and prevention of cervical issues from forward head position. Discussed disc anatomy and behavior. Progressed core control awareness for lumbopelvic control. Pt initially well challenged w TA dkc. Improved breath control w counting aloud. Pt required man A to gain pattern of exhale, pelvic control prior to leg elevation. Pt remains challenged to avoid overfiring of post dominant muscle pattern. Physical Therapy Goals: From 11/15/2022 to 2/13/2023  - Created with patient input during initial evaluation   1. Pt will be independent in beginning level of HEP for stretching, posture and strengthening. 2. Pt will be able to get in and out of the tub without increased symptoms. 3. Pt will be able to walk for exercise 15 min x 2 without increased symptoms.   4. Pt will be able to stand to have a conversation 10 min without increased symptoms. 5. Pt will be able to lift 25# without increased symptoms. PLAN OF CARE:      Assess response to progression of lumbopelvic control. Continue PT per original plan for therapeutic exercises, posture retraining, therapeutic activities, manual treatment, neuromuscular reeducation, therapeutic pain neuroscience education, patient education, self care home management, home exercise program, and modalities as needed. Charges: Trinidad Roper   Total Timed treatment: 40 min      Total Treatment Time: 40 min     __________________________________________________________________________________________      21st Century Cures Act Notice to Patient: Medical documents like this are made available to patients in the interest of transparency. However, be advised this is a medical document and it is intended as [id number] communication between your medical providers. This medical document may contain abbreviations, assessments, medical data, and results or other terms that are unfamiliar. Medical documents are intended to carry relevant information, facts as evident, and the clinical opinion of the practitioner. As such, this medical document may be written in language that appears blunt or direct. You are encouraged to contact your medical provider and/or [facility] Experience if you have any questions about this medical document.   Objective Initial Evaluation Data 11/15/2022:    Functional Scores 11/15/2022   FOTO primary measure 57       Posture 11/15/2022   Alignment L shoulder elevated, L trunk shift, increased thoracic kyphosis, pendulous abdomen, forward head position, apparent scoliosis; min B genu recurvatum       Body mechanics 11/15/2022   Reaching for personal belongings fair       Sensation 11/15/2022   Dermatomes Light Touch    Proximal medial thigh R (L2)  wnl   Proximal medial thigh L (L2) wnl   Above knee R (L3) wnl   Above knee L (L3) wnl   Medial arch R(L4) wnl   Medial arch L (L4) wnl   Between 1st - 2nd toes R (L5) wnl   Between 1st - 2nd toes L (L5) wnl   Lateral border of foot R (S1) wnl   Lateral border of foot L (S1) wnl   Popliteal fossa R (S2) wnl   Popliteal fossa L(S2) wnl       Lumbopelvic 11/15/2022   Control  Mod loss        ROM Lumbar 11/15/2022   Flexion Min-mod   Extension Wnl, all lower lumbar, mod-max loss T   R SB Min, all lower T   L SB Min, all lower T   R Rotation min   L Rotation min   * pain limiting    ROM Hip 11/15/2022   Flex R 125 117   Flex L 125 115   ER R 45 45   ER L 45 45   IR R 40 0   IR L 40 7   *pain limiting     MMT 5/5 11/15/2022   L2- hip flex R 4-   Hip flex L 4-   L3- knee ext R 5   Knee ext L 4   L4- ankle DF R 4   Ankle DF L 4   L5- EHL R 4   EHL L 3   S1- ankle PF R 4   Ankle PF L 4   S2- Hams R 4+   Hams L 5-        12/15/2022    Hip abd R 5-   Hip abd L 5-   Hip extn R 5-   Hip extn L 4-   *pain limiting    LE flexibility 11/15/2022   Piriformis L wnl   Piriformis R wnl   Hams L -27   Hams R -20   *pain limiting    Neural mobility 11/15/2022   SLR R 65 myofascial tension only   SLR L 45 myofascial tension only      JUJU 11/15/2022   R mod   L Min-mod      Diastasis in fingers 12/15/2022   12 cm above umbilicus 1   6 cm above umbilicus 1   At umbilicus 1   6 cm below umbilicus 1-2   12 cm below umbilicus 1

## 2022-12-30 ENCOUNTER — OFFICE VISIT (OUTPATIENT)
Dept: PHYSICAL THERAPY | Facility: HOSPITAL | Age: 57
End: 2022-12-30
Attending: PHYSICAL MEDICINE & REHABILITATION
Payer: COMMERCIAL

## 2022-12-30 PROCEDURE — 97110 THERAPEUTIC EXERCISES: CPT

## 2022-12-30 PROCEDURE — 97112 NEUROMUSCULAR REEDUCATION: CPT

## 2024-08-02 ENCOUNTER — TELEPHONE (OUTPATIENT)
Dept: PHYSICAL THERAPY | Facility: HOSPITAL | Age: 59
End: 2024-08-02

## 2024-08-02 ENCOUNTER — OFFICE VISIT (OUTPATIENT)
Dept: PHYSICAL THERAPY | Facility: HOSPITAL | Age: 59
End: 2024-08-02
Attending: FAMILY MEDICINE
Payer: COMMERCIAL

## 2024-08-02 DIAGNOSIS — M54.42 ACUTE LEFT-SIDED LOW BACK PAIN WITH LEFT-SIDED SCIATICA: Primary | ICD-10-CM

## 2024-08-02 PROCEDURE — 97140 MANUAL THERAPY 1/> REGIONS: CPT | Performed by: PHYSICAL THERAPIST

## 2024-08-02 PROCEDURE — 97161 PT EVAL LOW COMPLEX 20 MIN: CPT | Performed by: PHYSICAL THERAPIST

## 2024-08-02 PROCEDURE — 97110 THERAPEUTIC EXERCISES: CPT | Performed by: PHYSICAL THERAPIST

## 2024-08-02 NOTE — PROGRESS NOTES
SPINE EVALUATION:     Diagnosis:   Acute left-sided low back pain with left-sided sciatica (M54.42)       Referring Provider: Preston Finch  Date of Evaluation:    8/2/2024    Precautions:  None Next MD visit:   none scheduled  Date of Surgery: n/a     PATIENT SUMMARY   Lluvia Gonzales is a 59 year old female who presents to therapy today with complaints of L sided low back with radiating pain down towards her posterior ankle that occurred ~8 weeks ago without MARGARITA. She also has N/T in her posterior ankle. No recent imaging was performed. Ambulating makes her pain worse while resting and laying down makes her pain better. She is currently taking Tylenol for pain relief.   Pt describes pain level current 4/10, at best 0/10, at worst 8/10.   Current functional limitations include difficulty ambulating, squatting, and performing transfers.     Lluvia describes prior level of function as independent. Pt goals include ambulating, squatting, and performing transfers with less pain and difficulty.   Past medical history was reviewed with Lluvia. She has a past medical history of Diabetes (HCC), Essential hypertension, and Hyperlipidemia.   Pt denies diplopia, dysarthria, dysphasia, dizziness, drop attacks, bowel/bladder changes, and saddle anesthesia.   ASSESSMENT  Lluvia presents to physical therapy evaluation with primary c/o L sided low back with radiating pain down towards her posterior ankle. The results of the objective tests and measures show decreased thoracolumbar spine side-bending and rotation ROM, decreased L hip ABD and ER strength, and impaired posture and balance. Functional deficits include but are not limited to difficulty ambulating, squatting, and performing transfers. Signs and symptoms are consistent with diagnosis of left-sided low back pain with left-sided sciatica. Pt and PT discussed evaluation findings, pathology, POC and HEP.  Pt voiced understanding and performs HEP  correctly without reported pain. Skilled Physical Therapy is medically necessary to address the above impairments and reach functional goals.     OBJECTIVE:   Observation/Posture: FHP & Slump sitting   Neuro Screen: Bilat. Light touch is intact     Thoracolumbar AROM: (* denotes performed with pain)  Flexion: 80  Extension: 25  Sidebending: R 25*; L 25  Rotation: R 50*; L 50    Accessory motion: Hypomobility with central PA glides in the lumbar spine (L1-L5)   Palpation: TTP in the L lumbar paraspinals     Strength: (* denotes performed with pain)  LE   Hip flexion (L2): R 5/5; L 4/5  Hip abduction: R 4/5; L 3/5  Hip Extension: R 4/5; L 3/5   Hip ER: R 4/5; L 3/5  Hip IR: R 5/5; L 5/5  Knee Flexion: R 5/5; L 5/5   Knee extension (L3): R 5/5; L 5/5   DF (L4): R 5/5; L 5/5  PF (S1): R 4/5; L 3/5     Special tests:   SLR & Slump: positive     Gait: pt ambulates on level ground with decreased stance phase in the LLE .  Balance: SLS R >30s, L 10s    Today’s Treatment and Response:   Pt education was provided on exam findings, treatment diagnosis, treatment plan, expectations, and prognosis. Pt was also provided recommendations for activity modifications  Patient was instructed in and issued a HEP for: SKTC, piriformis stretch, & bridges   Therapeutic exercises:   SKTC 3x30s ea bilat  Piriformis stretch 3x30s ea bilat  Bridges 3x10   Manual treatment: STM in bilateral lumbar paraspinals & central PA glides in the lumbar spine (L1-L5) (Grade I-II)    Charges: PT Eval Low Complexity, 1 Therapeutic exercises 1 Manual treatment     Total Timed Treatment: 30 min     Total Treatment Time: 45 min     Based on clinical rationale and outcome measures, this evaluation involved Low Complexity decision making due to 1-2 personal factors/comorbidities, 3 body structures involved/activity limitations, and evolving symptoms including changing pain levels.  PLAN OF CARE:        Frequency / Duration: Patient will be seen for 2 x/week or a  total of 8 visits over a 90 day period. Treatment will include: Gait training, Manual Therapy, Neuromuscular Re-education, Therapeutic Activities, Therapeutic Exercise, and Home Exercise Program instruction    Education or treatment limitation: None  Rehab Potential:good    LEFS Score  LEFS Score: 68.75 % (8/2/2024  1:44 PM)    Patient/Family/Caregiver was advised of these findings, precautions, and treatment options and has agreed to actively participate in planning and for this course of care.    Thank you for your referral. Please co-sign or sign and return this letter via fax as soon as possible to [phone]. If you have any questions, please contact me at Dept: [phone]    Sincerely,  Electronically signed by therapist: Elliott Haskins PT    Physician's certification required: Yes  I certify the need for these services furnished under this plan of treatment and while under my care.    X___________________________________________________ Date____________________    Certification From: 8/2/2024  To:10/31/2024

## 2024-08-07 ENCOUNTER — OFFICE VISIT (OUTPATIENT)
Dept: PHYSICAL THERAPY | Facility: HOSPITAL | Age: 59
End: 2024-08-07
Attending: FAMILY MEDICINE
Payer: COMMERCIAL

## 2024-08-07 PROCEDURE — 97140 MANUAL THERAPY 1/> REGIONS: CPT | Performed by: PHYSICAL THERAPIST

## 2024-08-07 PROCEDURE — 97112 NEUROMUSCULAR REEDUCATION: CPT | Performed by: PHYSICAL THERAPIST

## 2024-08-07 PROCEDURE — 97110 THERAPEUTIC EXERCISES: CPT | Performed by: PHYSICAL THERAPIST

## 2024-08-07 NOTE — PROGRESS NOTES
Diagnosis:   Acute left-sided low back pain with left-sided sciatica (M54.42)       Referring Provider: Preston Finch  Date of Evaluation:    8/2/2024     Precautions:  None Next MD visit:   none scheduled  Date of Surgery: n/a   Insurance Primary/Secondary: [employer] [place] / N/A     # Auth Visits: 8            Subjective: Patient reports still having pain in the L side of her low back that radiates down towards her L calf during the evenings.     Pain: 0/10      Objective:   *Performed at [facility]    Observation/Posture: FHP & Slump sitting   Neuro Screen: Bilat. Light touch is intact      Thoracolumbar AROM: (* denotes performed with pain)  Flexion: 80  Extension: 25  Sidebending: R 25*; L 25  Rotation: R 50*; L 50     Accessory motion: Hypomobility with central PA glides in the lumbar spine (L1-L5)   Palpation: TTP in the L lumbar paraspinals      Strength: (* denotes performed with pain)  LE   Hip flexion (L2): R 5/5; L 4/5  Hip abduction: R 4/5; L 3/5  Hip Extension: R 4/5; L 3/5            Hip ER: R 4/5; L 3/5  Hip IR: R 5/5; L 5/5  Knee Flexion: R 5/5; L 5/5            Knee extension (L3): R 5/5; L 5/5            DF (L4): R 5/5; L 5/5  PF (S1): R 4/5; L 3/5      Special tests:   SLR & Slump: positive      Gait: pt ambulates on level ground with decreased stance phase in the LLE .  Balance: SLS R >30s, L 10s      Assessment: Due to continuing pain, prone on elbow exercise was added today to improve her lumbar spine mobility. Also, new rows, shoulder extension, and Palloff press exercises were added today to improve her posture strength so that she can stand and ambulate with less pain and difficulty. She tolerated well with all exercises without any significant increase in pain. Patient did had increased neural and muscle tension in her gastroc while performing standing exercises so gastroc stretch was added to HEP.       Goals:   Pt will demonstrate good understanding of proper posture and body mechanics to decrease  pain and improve spinal safety   Pt will improve lumbar spine AROM flexion to >80 deg to allow increase ease with bending forward to pick objects off the floor   Pt will report improved symptom centralization and absence of radicular symptoms for 3 consecutive days to improve function with ADL   Pt will have decreased paraspinal mm tension to tolerate ambulating >30 minutes for work and home activities   Pt will demonstrate improved core strength to be able to perform bending with <2/10 pain   Pt will be independent and compliant with comprehensive HEP to maintain progress achieved in PT      Plan: Add core strengthening exercises.   Date: 8/7/2024  TX#: 2/8 Date:                 TX#: 3/ Date:                 TX#: 4/ Date:                 TX#: 5/ Date:   Tx#: 6/   Therapeutic exercises  SKTC 3x30s ea bilat  Piriformis stretch 3x30s ea bilat  Bridges 3x10   Prone on Elbows [id number]  Incline gastroc stretch 3x30s        Therapeutic activities         Manual treatment  STM in bilateral lumbar paraspinals & central PA glides in the lumbar spine (L1-L5) (Grade I-II)        Neuromuscular Re-education  R&L Palloff Press 3x10 ea YTB  Bilateral scapular retraction + Rows 3x10 YTB  Bilateral scapular retraction + Shoulder extension 3x10 YTB  Supine TrA + BKFO 3x10 ea YTB       HEP: Prone on elbows & gastroc stretch    Charges: 1 Therapeutic exercises, 1 Neuromuscular Re-education, 1 Manual treatment        Total Timed Treatment: 45 min  Total Treatment Time: 45 min

## 2024-08-09 ENCOUNTER — OFFICE VISIT (OUTPATIENT)
Dept: PHYSICAL THERAPY | Facility: HOSPITAL | Age: 59
End: 2024-08-09
Attending: FAMILY MEDICINE
Payer: COMMERCIAL

## 2024-08-09 PROCEDURE — 97110 THERAPEUTIC EXERCISES: CPT | Performed by: PHYSICAL THERAPIST

## 2024-08-09 PROCEDURE — 97112 NEUROMUSCULAR REEDUCATION: CPT | Performed by: PHYSICAL THERAPIST

## 2024-08-09 PROCEDURE — 97140 MANUAL THERAPY 1/> REGIONS: CPT | Performed by: PHYSICAL THERAPIST

## 2024-08-09 NOTE — PROGRESS NOTES
Diagnosis:   Acute left-sided low back pain with left-sided sciatica (M54.42)       Referring Provider: Preston Finch  Date of Evaluation:    8/2/2024     Precautions:  None Next MD visit:   none scheduled  Date of Surgery: n/a   Insurance Primary/Secondary: [employer] / N/A     # Auth Visits: 8            Subjective: Patient reports having some pain in the L side of her low back that radiates down towards her L calf today.   Pain: 1/10      Objective:   *Performed at IE    Observation/Posture: FHP & Slump sitting   Neuro Screen: Bilat. Light touch is intact      Thoracolumbar AROM: (* denotes performed with pain)  Flexion: 80  Extension: 25  Sidebending: R 25*; L 25  Rotation: R 50*; L 50     Accessory motion: Hypomobility with central PA glides in the lumbar spine (L1-L5)   Palpation: TTP in the L lumbar paraspinals      Strength: (* denotes performed with pain)  LE   Hip flexion (L2): R 5/5; L 4/5  Hip abduction: R 4/5; L 3/5  Hip Extension: R 4/5; L 3/5            Hip ER: R 4/5; L 3/5  Hip IR: R 5/5; L 5/5  Knee Flexion: R 5/5; L 5/5            Knee extension (L3): R 5/5; L 5/5            DF (L4): R 5/5; L 5/5  PF (S1): R 4/5; L 3/5      Special tests:   SLR & Slump: positive      Gait: pt ambulates on level ground with decreased stance phase in the LLE .  Balance: SLS R >30s, L 10s      Assessment: Due to difficulty standing and ambulating for prolonged periods of time, new dead bug exercise was added today to improve her core strength. She tolerated well with all exercises without any significant increase in pain. She continues to have neural and muscle tension in her gastroc while performing standing exercises and will benefit from PT for further core strength.       Goals:   Pt will demonstrate good understanding of proper posture and body mechanics to decrease pain and improve spinal safety   Pt will improve lumbar spine AROM flexion to >80 deg to allow increase ease with bending forward to pick objects  off the floor   Pt will report improved symptom centralization and absence of radicular symptoms for 3 consecutive days to improve function with ADL   Pt will have decreased paraspinal mm tension to tolerate ambulating >30 minutes for work and home activities   Pt will demonstrate improved core strength to be able to perform bending with <2/10 pain   Pt will be independent and compliant with comprehensive HEP to maintain progress achieved in PT      Plan: Progress exercises.   Date: 8/7/2024  TX#: 2/8 Date: 8/9/2014        TX#: 3/8 Date:                 TX#: 4/ Date:                 TX#: 5/ Date:   Tx#: 6/   Therapeutic exercises  SKTC 3x30s ea bilat  Piriformis stretch 3x30s ea bilat  Bridges 3x10   Prone on Elbows [id number]  Incline gastroc stretch 3x30s  SKTC 3x30s ea bilat  Piriformis stretch 3x30s ea bilat  Bridges 3x10   Prone on Elbows [id number]  Incline gastroc stretch 3x30s       Therapeutic activities         Manual treatment  STM in bilateral lumbar paraspinals & central PA glides in the lumbar spine (L1-L5) (Grade I-II)  STM in bilateral lumbar paraspinals & central PA glides in the lumbar spine (L1-L5) (Grade I-II)       Neuromuscular Re-education  R&L Palloff Press 3x10 ea YTB  Bilateral scapular retraction + Rows 3x10 YTB  Bilateral scapular retraction + Shoulder extension 3x10 YTB  Supine TrA + BKFO 3x10 ea YTB R&L Palloff Press 3x10 ea YTB  Bilateral scapular retraction + Rows 3x10 YTB  Bilateral scapular retraction + Shoulder extension 3x10 YTB  Supine TrA + BKFO 3x10 ea YTB  Dead bug 3x10 ea      HEP: Dead bug     Charges: 1 Therapeutic exercises, 1 Neuromuscular Re-education, 1 Manual treatment        Total Timed Treatment: 45 min  Total Treatment Time: 45 min

## 2024-08-14 ENCOUNTER — APPOINTMENT (OUTPATIENT)
Dept: PHYSICAL THERAPY | Facility: HOSPITAL | Age: 59
End: 2024-08-14
Attending: FAMILY MEDICINE
Payer: COMMERCIAL

## 2024-08-16 ENCOUNTER — OFFICE VISIT (OUTPATIENT)
Dept: PHYSICAL THERAPY | Facility: HOSPITAL | Age: 59
End: 2024-08-16
Attending: FAMILY MEDICINE
Payer: COMMERCIAL

## 2024-08-16 PROCEDURE — 97110 THERAPEUTIC EXERCISES: CPT | Performed by: PHYSICAL THERAPIST

## 2024-08-16 PROCEDURE — 97140 MANUAL THERAPY 1/> REGIONS: CPT | Performed by: PHYSICAL THERAPIST

## 2024-08-16 PROCEDURE — 97112 NEUROMUSCULAR REEDUCATION: CPT | Performed by: PHYSICAL THERAPIST

## 2024-08-16 NOTE — PROGRESS NOTES
Diagnosis:   Acute left-sided low back pain with left-sided sciatica (M54.42)       Referring Provider: Preston Finch  Date of Evaluation:    8/2/2024     Precautions:  None Next MD visit:   none scheduled  Date of Surgery: n/a   Insurance Primary/Secondary: [employer] / N/A     # Auth Visits: 8            Subjective: Patient reports having no pain today.   Pain: 0/10      Objective:     Thoracolumbar AROM: (* denotes performed with pain)  Flexion: 80  Extension: 25  Sidebending: R 25*; L 25  Rotation: R 50*; L 50     Assessment: Patient has been benefiting well with manual treatment and HEP with reports of improved pain. Due to improved, pain she is able to ambulate longer distances with less pain. Exercises were progressed today to further improve his strength so that she can have no pain while ambulating long distances.     Goals:   Pt will demonstrate good understanding of proper posture and body mechanics to decrease pain and improve spinal safety   Pt will improve lumbar spine AROM flexion to >80 deg to allow increase ease with bending forward to pick objects off the floor   Pt will report improved symptom centralization and absence of radicular symptoms for 3 consecutive days to improve function with ADL   Pt will have decreased paraspinal mm tension to tolerate ambulating >30 minutes for work and home activities   Pt will demonstrate improved core strength to be able to perform bending with <2/10 pain   Pt will be independent and compliant with comprehensive HEP to maintain progress achieved in PT      Plan: Add wall planks.    Date: 8/7/2024  TX#: 2/8 Date: 8/9/2024        TX#: 3/8 Date: 8/16/2024            TX#: 4/8  Date:                 TX#: 5/ Date:   Tx#: 6/   Therapeutic exercises  SKTC 3x30s ea bilat  Piriformis stretch 3x30s ea bilat  Bridges 3x10   Prone on Elbows [id number]  Incline gastroc stretch 3x30s  SKTC 3x30s ea bilat  Piriformis stretch 3x30s ea bilat  Bridges 3x10   Prone on Elbows  [id number]  Incline gastroc stretch 3x30s  SKTC 3x30s ea bilat  Piriformis stretch 3x30s ea bilat  Bridges 3x10   Prone on Elbows [id number]  Incline gastroc stretch 3x30s      Therapeutic activities         Manual treatment  STM in bilateral lumbar paraspinals & central PA glides in the lumbar spine (L1-L5) (Grade I-II)  STM in bilateral lumbar paraspinals & central PA glides in the lumbar spine (L1-L5) (Grade I-II)  STM in bilateral lumbar paraspinals & central PA glides in the lumbar spine (L1-L5) (Grade I-II)      Neuromuscular Re-education  R&L Palloff Press 3x10 ea YTB  Bilateral scapular retraction + Rows 3x10 YTB  Bilateral scapular retraction + Shoulder extension 3x10 YTB  Supine TrA + BKFO 3x10 ea YTB R&L Palloff Press 3x10 ea YTB  Bilateral scapular retraction + Rows 3x10 YTB  Bilateral scapular retraction + Shoulder extension 3x10 YTB  Supine TrA + BKFO 3x10 ea YTB  Dead bug 3x10 ea R&L Palloff Press 3x10 ea RTB  Bilateral scapular retraction + Rows 3x10 RTB  Bilateral scapular retraction + Shoulder extension Rx10 YTB  Supine TrA + BKFO 3x10 ea RTB  Dead bug 3x10 ea     HEP: BKFO, Rows, shoulder extension, & Palloff press     Charges: 1 Therapeutic exercises, 1 Neuromuscular Re-education, 1 Manual treatment        Total Timed Treatment: 45 min  Total Treatment Time: 45 min

## 2024-08-21 ENCOUNTER — APPOINTMENT (OUTPATIENT)
Dept: PHYSICAL THERAPY | Facility: HOSPITAL | Age: 59
End: 2024-08-21
Attending: FAMILY MEDICINE
Payer: COMMERCIAL

## 2024-08-28 ENCOUNTER — OFFICE VISIT (OUTPATIENT)
Dept: PHYSICAL THERAPY | Facility: HOSPITAL | Age: 59
End: 2024-08-28
Attending: FAMILY MEDICINE
Payer: COMMERCIAL

## 2024-08-28 PROCEDURE — 97112 NEUROMUSCULAR REEDUCATION: CPT | Performed by: PHYSICAL THERAPIST

## 2024-08-28 PROCEDURE — 97140 MANUAL THERAPY 1/> REGIONS: CPT | Performed by: PHYSICAL THERAPIST

## 2024-08-28 PROCEDURE — 97110 THERAPEUTIC EXERCISES: CPT | Performed by: PHYSICAL THERAPIST

## 2024-08-28 NOTE — PROGRESS NOTES
Discharge Summary  Pt has attended 5 visits in Physical Therapy.    Diagnosis:   Acute left-sided low back pain with left-sided sciatica (M54.42)       Referring Provider: Preston Finch  Date of Evaluation:    8/2/2024     Precautions:  None Next MD visit:   none scheduled  Date of Surgery: n/a   Insurance Primary/Secondary: [employer] [place] / N/A     # Auth Visits: [id number]            Subjective: Patient reports improved pain since starting PT.   Pain: 0/10      Objective:   Observation/Posture: FHP & Slump sitting   Neuro Screen: Bilat. Light touch is intact      *8/2/24 Thoracolumbar AROM: (* denotes performed with pain)  Flexion: 80  Extension: 25  Sidebending: R 25*; L 25  Rotation: R 50*; L 50    *8/28/24 Thoracolumbar AROM: (* denotes performed with pain)  Flexion: 90  Extension: 25  Sidebending: R 30; L 30  Rotation: R 50*; L 50     Accessory motion: Improved mobility with central PA glides in the lumbar spine (L1-L5)   Palpation: Mild TTP in the L lumbar paraspinals      Strength: (* denotes performed with pain)  LE 8/2/24 8/28/24    Hip flexion (L2): R 5/5; L 4/5  Hip abduction: R 4/5; L 3/5  Hip Extension: R 4/5; L 3/5            Hip ER: R 4/5; L 3/5  Hip IR: R 5/5; L 5/5  Knee Flexion: R 5/5; L 5/5            Knee extension (L3): R 5/5; L 5/5            DF (L4): R 5/5; L 5/5  PF (S1): R 4/5; L 3/5 Hip flexion (L2): R 5/5; L 4/5  Hip abduction: R 4/5; L 3/5  Hip Extension: R 4/5; L 4/5            Hip ER: R 4/5; L 4/5  Hip IR: R 5/5; L 5/5  Knee Flexion: R 5/5; L 5/5            Knee extension (L3): R 5/5; L 5/5            DF (L4): R 5/5; L 5/5  PF (S1): R 4/5; L 4/5      Gait: pt ambulates on level ground with decreased stance phase in the LLE .  Balance: SLS R >30s, L 10s     Assessment: Patient has benefited well in PT with improved pain, ROM, and strength. Due to improvements, she is having less pain and difficulty ambulating for prolonged periods of time, bending, and squatting. Although she still has  occasional pain, she feels confident in performing HEP independently for further improvements. She was educated on HEP and she verbalized understanding. Patient has achieved all goals and is ready for discharge at this time.      Goals:   Pt will demonstrate good understanding of proper posture and body mechanics to decrease pain and improve spinal safety (achieved)   Pt will improve lumbar spine AROM flexion to >80 deg to allow increase ease with bending forward to pick objects off the floor (achieved)   Pt will report improved symptom centralization and absence of radicular symptoms for 3 consecutive days to improve function with ADL (achieved)    Pt will have decreased paraspinal mm tension to tolerate ambulating >30 minutes for work and home activities (achieved)   Pt will demonstrate improved core strength to be able to perform bending with <2/10 pain (achieved)   Pt will be independent and compliant with comprehensive HEP to maintain progress achieved in PT (achieved)     Post LEFS Score  No data recorded  -85 % improvement    Plan: Discharge from skilled Physical Therapy with HEP.     Thank you for your referral. If you have any questions, please contact me at Dept: [phone].    Sincerely,  Electronically signed by therapist: Elliott Haskins, PT        Date: 8/7/2024  TX#: 2/8 Date: 8/9/2024        TX#: 3/8 Date: 8/16/2024            TX#: 4/8  Date: 8/28/2024           TX#: 5/8   Therapeutic exercises  SKTC 3x30s ea bilat  Piriformis stretch 3x30s ea bilat  Bridges 3x10   Prone on Elbows [id number]  Incline gastroc stretch 3x30s  SKTC 3x30s ea bilat  Piriformis stretch 3x30s ea bilat  Bridges 3x10   Prone on Elbows [id number]  Incline gastroc stretch 3x30s  SKTC 3x30s ea bilat  Piriformis stretch 3x30s ea bilat  Bridges 3x10   Prone on Elbows [id number]  Incline gastroc stretch 3x30s  SKTC 3x30s ea bilat  Piriformis stretch 3x30s ea bilat  Bridges 3x10   Prone on Elbows [id number]  Incline gastroc stretch 3x30s     Therapeutic activities        Manual treatment  STM in bilateral lumbar paraspinals & central PA glides in the lumbar spine (L1-L5) (Grade I-II)  STM in bilateral lumbar paraspinals & central PA glides in the lumbar spine (L1-L5) (Grade I-II)  STM in bilateral lumbar paraspinals & central PA glides in the lumbar spine (L1-L5) (Grade I-II)  STM in bilateral lumbar paraspinals & central PA glides in the lumbar spine (L1-L5) (Grade I-II)    Neuromuscular Re-education  R&L Palloff Press 3x10 ea YTB  Bilateral scapular retraction + Rows 3x10 YTB  Bilateral scapular retraction + Shoulder extension 3x10 YTB  Supine TrA + BKFO 3x10 ea YTB R&L Palloff Press 3x10 ea YTB  Bilateral scapular retraction + Rows 3x10 YTB  Bilateral scapular retraction + Shoulder extension 3x10 YTB  Supine TrA + BKFO 3x10 ea YTB  Dead bug 3x10 ea R&L Palloff Press 3x10 ea RTB  Bilateral scapular retraction + Rows 3x10 RTB  Bilateral scapular retraction + Shoulder extension Rx10 YTB  Supine TrA + BKFO 3x10 ea RTB  Dead bug 3x10 ea R&L Palloff Press 3x10 ea RTB  Bilateral scapular retraction + Rows 3x10 RTB  Bilateral scapular retraction + Shoulder extension Rx10 YTB  Supine TrA + BKFO 3x10 ea RTB  Dead bug 3x10 ea   HEP: BKFO, Rows, shoulder extension, & Palloff press     Charges: 1 Therapeutic exercises, 1 Neuromuscular Re-education, 1 Manual treatment        Total Timed Treatment: 45 min  Total Treatment Time: 45 min

## 2024-11-25 ENCOUNTER — OFFICE VISIT (OUTPATIENT)
Dept: PHYSICAL MEDICINE AND REHAB | Facility: CLINIC | Age: 59
End: 2024-11-25
Payer: COMMERCIAL

## 2024-11-25 VITALS — WEIGHT: 208 LBS | HEIGHT: 66 IN | BODY MASS INDEX: 33.43 KG/M2

## 2024-11-25 DIAGNOSIS — M51.24 HERNIATED THORACIC DISC WITHOUT MYELOPATHY: ICD-10-CM

## 2024-11-25 DIAGNOSIS — M51.26 LUMBAR HERNIATED DISC: ICD-10-CM

## 2024-11-25 DIAGNOSIS — M51.9 LUMBAR DISC DISEASE: ICD-10-CM

## 2024-11-25 DIAGNOSIS — M54.16 LUMBAR RADICULOPATHY: Primary | ICD-10-CM

## 2024-11-25 DIAGNOSIS — M48.061 SPINAL STENOSIS OF LUMBAR REGION WITHOUT NEUROGENIC CLAUDICATION: ICD-10-CM

## 2024-11-25 PROCEDURE — 99214 OFFICE O/P EST MOD 30 MIN: CPT | Performed by: PHYSICAL MEDICINE & REHABILITATION

## 2024-11-25 PROCEDURE — 3008F BODY MASS INDEX DOCD: CPT | Performed by: PHYSICAL MEDICINE & REHABILITATION

## 2024-11-25 NOTE — PATIENT INSTRUCTIONS
Plan  She will resume and become consistent with her home exercise program.    I will hold on having her get a new MRI of the lumbar spine for now.  If she is not getting better with the home exercise program, then she will need a new MRI of the lumbar spine.    She will follow up in 3-4 months or sooner if needed.

## 2024-11-25 NOTE — PROGRESS NOTES
Low Back Pain H & P    Chief Complaint:   Chief Complaint   Patient presents with    Follow - Up     LOV 9/22/24. F/U for low back pain and L leg sciatic pain. PT improved sciatic pain but low back pain reaches 5-6/10 on bad days. Admits tingling in L calf. Denies weakness. No pain meds. PT ended in 09/2024 with significant improvement of L sciatic pain. No recent imaging.     Nursing note reviewed and verified.    Patient was last seen on 9/22/2022.  In August, she developed left buttock pain with radiation to the left lateral thigh and lower leg with numbness from the knee down and spasms with walking.  She is wondering if sitting at the desk for a long time was the cause of this.  She made an appointment here and saw her PCP who had her get into the PT with Jarrod Haskins which was helpful.  This helped her 80%.  She had 5 sessions.  She has not been doing her HEP and she is still 80% better.  She has noticed that standing while hold a box caused her to have increased low back pain.    Description of the Pain  The pain is located in the left > right low back.    The pain radiates to the bilateral buttock and bilateral proximal posterior thighs.  The pain at its best is 0/10. The pain at its worst is 8/10. The pain is currently  0/10.  The pain is described as a(n) sharp sensation.    The pain is worse standing and in the evening.    The pain is better sitting and in the morning.  There is numbness in the left lateral lower leg.  There is no tingling in the legs.  There is not weakness in both legs.   No bowel or bladder changes    Past Medical History   Past Medical History:    Diabetes (HCC)    Essential hypertension    Hyperlipidemia       Past Surgical History   No past surgical history on file.    Family History   Family History   Problem Relation Age of Onset    Stroke Father     Cancer Father         throat cancer    Breast Cancer Mother     Diabetes Mother        Social History   Social History     Socioeconomic  History    Marital status:      Spouse name: Not on file    Number of children: Not on file    Years of education: Not on file    Highest education level: Not on file   Occupational History    Not on file   Tobacco Use    Smoking status: Former     Types: Cigarettes    Smokeless tobacco: Never    Tobacco comments:     30 years ago   Vaping Use    Vaping status: Never Used   Substance and Sexual Activity    Alcohol use: Yes     Alcohol/week: 0.0 standard drinks of alcohol     Comment: occ    Drug use: No    Sexual activity: Not on file   Other Topics Concern     Service Not Asked    Blood Transfusions Not Asked    Caffeine Concern Yes     Comment: 1 cup of coffee daily    Occupational Exposure Not Asked    Hobby Hazards Not Asked    Sleep Concern Not Asked    Stress Concern Not Asked    Weight Concern Not Asked    Special Diet Not Asked    Back Care Not Asked    Exercise Yes     Comment: walking twice daily    Bike Helmet Not Asked    Seat Belt Not Asked    Self-Exams Not Asked   Social History Narrative    The patient does not use an assistive device..      The patient does live in a home with stairs.     Social Drivers of Health     Financial Resource Strain: Not on file   Food Insecurity: Not on file   Transportation Needs: Not on file   Physical Activity: Not on file   Stress: Not on file   Social Connections: Not on file   Housing Stability: Not on file       PE:  The patient does appear in her stated age in no distress.  The patient is well groomed.    Psychiatric:  The patient is alert and oriented x 3.  The patient has a normal affect and mood.      Respiratory:  No acute respiratory distress. Patient does not have a cough.    HEENT:  Extraocular muscles are intact. There is no kern icterus. Pupils are equal, round, and reactive to light. No redness or discharge bilaterally.    Skin:  There are no rashes or lesions.    Vitals:  There were no vitals filed for this visit.    Gait:    Gait: Normal  gait   Sit to Stand: no difficulty      Lumbar Spine:    Scoliosis: Thoracic dextroscoliosis that is moderate and Lumbar levoscoliosis that is moderate     Lumbar Spine Palpation:    Spinous Processes: Non-tender for all Spinous Processes   Z-joints: Non-tender for all Z-joints   SIJ: Non-tender for bilateral SIJ   Piriformis Muscle: Non-tender bilateral Piriformis muscles   Greater Trochanteric Bursa: Non-tender for bilateral Greater Trochanteric Bursa     Vascular lower extremity:   Dorsalis pedis pulse-RIGHT 2+   Dorsalis pedis pulse-LEFT 2+     Neurological Lower Extremity:    Light Touch Sensation: Intact in bilateral Lower Extremities   LE Muscle Strength: All LE strength measurements 5/5 except:  Gluteus medius RIGHT:   4+/5  Gluteus medius LEFT:   4+/5  Hamstring RIGHT:   4+/5  Hamstring LEFT:   4/5  Extensor Hallucis Longus RIGHT:   4+/5  Extensor Hallucis Longus LEFT:   4/5  Ankle inversion RIGHT:  4+/5  Ankle inversion LEFT:   4+/5   RIGHT plantar reflexes: downward response   LEFT plantar reflexes: downward response   Reflexes: 2+ in bilateral lower extremities     Assessment  1. bilateral L5-S1 radiculopathies    2. L4-5 mod central HNP, L5-S1 right paracentral & central mild -mod HNP    3. L3-4 mild diffuse bulging disc    4. L4-5 mod central stenosis    5. T6-7 right mild-mod HNP, T7-8 mild central HNP, T10-11 left paracentral mild HNP         Plan  She will resume and become consistent with her home exercise program.    I will hold on having her get a new MRI of the lumbar spine for now.  If she is not getting better with the home exercise program, then she will need a new MRI of the lumbar spine.    She will follow up in 3-4 months or sooner if needed.    The patient understands and agrees with the stated plan.  Andrea Blair MD  11/25/2024

## 2025-03-10 ENCOUNTER — OFFICE VISIT (OUTPATIENT)
Dept: PHYSICAL MEDICINE AND REHAB | Facility: CLINIC | Age: 60
End: 2025-03-10
Payer: COMMERCIAL

## 2025-03-10 VITALS — BODY MASS INDEX: 34 KG/M2 | WEIGHT: 208 LBS

## 2025-03-10 DIAGNOSIS — M54.16 LUMBAR RADICULOPATHY: Primary | ICD-10-CM

## 2025-03-10 DIAGNOSIS — M51.9 LUMBAR DISC DISEASE: ICD-10-CM

## 2025-03-10 DIAGNOSIS — M48.061 SPINAL STENOSIS OF LUMBAR REGION WITHOUT NEUROGENIC CLAUDICATION: ICD-10-CM

## 2025-03-10 DIAGNOSIS — M51.24 HERNIATED THORACIC DISC WITHOUT MYELOPATHY: ICD-10-CM

## 2025-03-10 DIAGNOSIS — M51.26 LUMBAR HERNIATED DISC: ICD-10-CM

## 2025-03-10 PROCEDURE — 99213 OFFICE O/P EST LOW 20 MIN: CPT | Performed by: PHYSICAL MEDICINE & REHABILITATION

## 2025-03-10 NOTE — PROGRESS NOTES
Low Back Pain H & P    Chief Complaint:   Chief Complaint   Patient presents with    Follow - Up     LOV 11/25/24 pt is here for a follow up on back pain. No n/t. Not taking any pain meds or muscle relaxer's. No current physical therapy. Pain 0/10     Nursing note reviewed and verified.    Patient was last seen on 11/25/2024.  She is not doing her HEP.  She has noticed that with standing, she will have left lateral lower leg pain and knee pain.  She will also have left low back pain.  The pain is better with leaning.  The pain is a 5/10 when she has this pain.    She is still getting the right upper back pain on occasion.  She got this mopping the floor and she will get it with more physical activity.  This pain is not a constant.  This pain does not radiate and it is a 7/10 when she has it.      Past Medical History   Past Medical History:    Diabetes (HCC)    Essential hypertension    Hyperlipidemia       Past Surgical History   History reviewed. No pertinent surgical history.    Family History   Family History   Problem Relation Age of Onset    Stroke Father     Cancer Father         throat cancer    Breast Cancer Mother     Diabetes Mother        Social History   Social History     Socioeconomic History    Marital status:      Spouse name: Not on file    Number of children: Not on file    Years of education: Not on file    Highest education level: Not on file   Occupational History    Not on file   Tobacco Use    Smoking status: Former     Types: Cigarettes    Smokeless tobacco: Never    Tobacco comments:     30 years ago   Vaping Use    Vaping status: Never Used   Substance and Sexual Activity    Alcohol use: Yes     Alcohol/week: 0.0 standard drinks of alcohol     Comment: occ    Drug use: No    Sexual activity: Not on file   Other Topics Concern     Service Not Asked    Blood Transfusions Not Asked    Caffeine Concern Yes     Comment: 1 cup of coffee daily    Occupational Exposure Not Asked     Hobby Hazards Not Asked    Sleep Concern Not Asked    Stress Concern Not Asked    Weight Concern Not Asked    Special Diet Not Asked    Back Care Not Asked    Exercise Yes     Comment: walking twice daily    Bike Helmet Not Asked    Seat Belt Not Asked    Self-Exams Not Asked   Social History Narrative    The patient does not use an assistive device..      The patient does live in a home with stairs.     Social Drivers of Health     Food Insecurity: Not on file   Transportation Needs: Not on file   Stress: Not on file   Housing Stability: Not on file       PE:  The patient does appear in her stated age in no distress.  The patient is well groomed.    Psychiatric:  The patient is alert and oriented x 3.  The patient has a normal affect and mood.      Respiratory:  No acute respiratory distress. Patient does not have a cough.    HEENT:  Extraocular muscles are intact. There is no kern icterus. Pupils are equal, round, and reactive to light. No redness or discharge bilaterally.    Skin:  There are no rashes or lesions.    Vitals:  There were no vitals filed for this visit.    Gait:    Gait: Normal gait   Sit to Stand: no difficulty      Lumbar Spine:    Scoliosis: Thoracic levoscoliosis that is moderate and Lumbar dextroscoliosis that is moderate     Lumbar Spine Palpation:    Spinous Processes: Non-tender for all Spinous Processes   Z-joints: Non-tender for all Z-joints   SIJ: Non-tender for bilateral SIJ   Piriformis Muscle: Non-tender bilateral Piriformis muscles   Greater Trochanteric Bursa: Non-tender for bilateral Greater Trochanteric Bursa     Vascular lower extremity:   Dorsalis pedis pulse-RIGHT 2+   Dorsalis pedis pulse-LEFT 2+   Tibialis posterior pulse-RIGHT 2+   Tibialis posterior pulse-LEFT 2+     Neurological Lower Extremity:    Light Touch Sensation: Intact in bilateral Lower Extremities   LE Muscle Strength: All LE strength measurements 5/5 except:  Hamstring LEFT:   4/5   RIGHT plantar reflexes:  downward response   LEFT plantar reflexes: downward response   Reflexes: 2+ in bilateral lower extremities except:  Right Achilles tendon which are absent     Assessment  1. bilateral L5-S1 radiculopathies    2. L3-4 mild diffuse bulging disc    3. L4-5 mod central HNP, L5-S1 right paracentral & central mild -mod HNP    4. L4-5 mod central stenosis    5. T6-7 right mild-mod HNP, T7-8 mild central HNP, T10-11 left paracentral mild HNP         Plan  I will hold on doing a left L5 TFESI.    She will resume her home exercise program.    She is not interested in taking gabapentin.    She will follow up in 3-6 months or sooner if needed.    The patient understands and agrees with the stated plan.  Andrea Blair MD  3/10/2025

## 2025-03-10 NOTE — PATIENT INSTRUCTIONS
Plan  I will hold on doing a left L5 TFESI.    She will resume her home exercise program.    She is not interested in taking gabapentin.    She will follow up in 3-6 months or sooner if needed.

## 2025-03-26 NOTE — PROGRESS NOTES
Sarbjit Alexandre    3/19/1965  Referring Physician:  Delores Blair  Diagnosis: Lumbar radiculopathy (M54.16)  Spinal stenosis of lumbar region without neurogenic claudication (M48.061)  Lumbar disc disease (M51.9)  Lumbar herniated disc (M51.26)  Herniated thoracic disc without myelopathy (M51.24)  Infantile idiopathic scoliosis of thoracolumbar region (M41.05)  Chronic pain of right knee (M25.561,G89.29)    Initial Evaluation Date: 11/15/2022  Date of Surgery: None for this diagnosis    Insurance type and authorized visits: [address] [place] [address], NA  Plan of care expires: For orders: 2/13/2023; For insurance: 9/22/2023    Precautions/Hx: DM, HTN, HLD    SUBJECTIVE:     Pt reports that she had mild abdominal muscle soreness after the last visit. She is lacking in sleep because her 's dialysis alarms go off at night.      Visit count 2/13/2023 1/8 3/8 5/8 6/8 7/8 8/8   Date 11/15/2022 11/29/2022  12/15/2022  12/19/2022  12/27/2022  12/30/2022    LBP/ R>L         Pain Range 0-1 to 5/10 0-1 to 4-5/10 0-1 to 5 /10 0-1 to 2-3/10 0-1 to 2/10 0-2/10   Pain Ave 1-2/10 0-1/10 1-2/10 1/10 1/10 1/10      OBJECTIVE:     Treatment performed this date:    Therapeutic Exercise:  Visit #   6/8 7/8 8/8   Position Exercise HEP 12/19/2022 12/27/2022 12/30/2022    Supine SKC H       DKC H       LTR H       Sciatic nerve glide H  x     Thoracic decompression H  X      T decompress chin tuck H  X      T decomprs scap retract H       T decomprs shldr horiz  H       T decomprs B shldr flex H       TA set w exhale   X      Pelvic tilt  X      P tilt TA set exhale ball hip add  X 10x mod cues      TA skc        CHON T8 extn TA exhale ribs down H  X 10x     TA dkc H  X 10x3 cues     T extn over roll    X Gabe SuperRoll 4 levels x 1 min    TB shldr scaption H   X 10x2 red    TB shldr horiz abd H   X 10x2 red   Foam roll Balancing         Chin tuck axial elongation        Horiz abd w median nerve glide        B shldr abd josefina wings B shldr flex axial elongation        TA set exhale ribs down       90-90 Lumbar decompression H X       TA set w exhale  X 10x      TA set w exhale self palp lower TA  X 10x      TA set w exhale palp TA & ribs H X 10x     Sitting Leg pump nerve glide H       TA set w exhale H       Thoracic extn over chair H   X 10x   Standing TB shldr extn pull down trunk flex        TB shldr flex  H       CHON  Doorway TA modified lift H       Corner/doorway stretch H   X 3x 30s   Neuro Re-ed  X see assessment  X see assessment  X see assessment     Posture        TNE               Man Tx Brachial chain -  upper and lower, B combo upper/lower w hold and soft diaphragmatic breathing        H = HEP. Pt given copies of this exercise for home program.  X = Exercises done this date - pt verbalized understanding and demonstrated competence. All exercises done B unless otherwise indicated. Pt advised to discontinue exercises that increase pain and to call or return to therapist to discuss. Each intervention above is specifically prescribed to address the patients identified impairments, activity limitations, and participation restrictions. ASSESSMENT:     Pt educated on the use of slow release to aid in myofascial release for thoracic kyphosis restrictions in supine and repetition for joint restrictions in sitting. 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 Discussed impact on lumbar spine w upper quadrant limitation. Pt given kinesiotape to B post shldr and T spine for proprioceptive awareness. Pt educated on strengthening concept of microfiber tears occurring normally causing muscle soreness. Advised pt this is normal.   Recommended resting from the exercise for a day, then returning to performance. Discussed integration of mobility exercises into her work and ADL's.       Physical Therapy Goals: From 11/15/2022 to 2/13/2023  - Created with patient input during initial evaluation   1. Pt will be independent in beginning level of HEP for stretching, posture and strengthening. 2. Pt will be able to get in and out of the tub without increased symptoms. 3. Pt will be able to walk for exercise 15 min x 2 without increased symptoms. 4. Pt will be able to stand to have a conversation 10 min without increased symptoms. 5. Pt will be able to lift 25# without increased symptoms. PLAN OF CARE:      Assess response to PRE's. Continue PT per original plan for therapeutic exercises, posture retraining, therapeutic activities, manual treatment, neuromuscular reeducation, therapeutic pain neuroscience education, patient education, self care home management, home exercise program, and modalities as needed. Charges: TE2, Neuro   Total Timed treatment: 40 min      Total Treatment Time: 40 min     __________________________________________________________________________________________      21st Century Cures Act Notice to Patient: Medical documents like this are made available to patients in the interest of transparency. However, be advised this is a medical document and it is intended as [id number] communication between your medical providers. This medical document may contain abbreviations, assessments, medical data, and results or other terms that are unfamiliar. Medical documents are intended to carry relevant information, facts as evident, and the clinical opinion of the practitioner. As such, this medical document may be written in language that appears blunt or direct. You are encouraged to contact your medical provider and/or [facility] Patient Experience if you have any questions about this medical document.   Objective Initial Evaluation Data 11/15/2022:    Functional Scores 11/15/2022   FOTO primary measure 57       Posture 11/15/2022   Alignment L shoulder elevated, L trunk shift, increased thoracic kyphosis, pendulous abdomen, forward head position, apparent scoliosis; min B genu recurvatum       Body mechanics 11/15/2022   Reaching for personal belongings fair       Sensation 11/15/2022   Dermatomes Light Touch    Proximal medial thigh R (L2)  wnl   Proximal medial thigh L (L2) wnl   Above knee R (L3) wnl   Above knee L (L3) wnl   Medial arch R(L4) wnl   Medial arch L (L4) wnl   Between 1st - 2nd toes R (L5) wnl   Between 1st - 2nd toes L (L5) wnl   Lateral border of foot R (S1) wnl   Lateral border of foot L (S1) wnl   Popliteal fossa R (S2) wnl   Popliteal fossa L(S2) wnl       Lumbopelvic 11/15/2022   Control  Mod loss        ROM Lumbar 11/15/2022   Flexion Min-mod   Extension Wnl, all lower lumbar, mod-max loss T   R SB Min, all lower T   L SB Min, all lower T   R Rotation min   L Rotation min   * pain limiting    ROM Hip 11/15/2022   Flex R 125 117   Flex L 125 115   ER R 45 45   ER L 45 45   IR R 40 0   IR L 40 7   *pain limiting     MMT 5/5 11/15/2022   L2- hip flex R 4-   Hip flex L 4-   L3- knee ext R 5   Knee ext L 4   L4- ankle DF R 4   Ankle DF L 4   L5- EHL R 4   EHL L 3   S1- ankle PF R 4   Ankle PF L 4   S2- Hams R 4+   Hams L 5-        12/15/2022    Hip abd R 5-   Hip abd L 5-   Hip extn R 5-   Hip extn L 4-   *pain limiting    LE flexibility 11/15/2022   Piriformis L wnl   Piriformis R wnl   Hams L -27   Hams R -20   *pain limiting    Neural mobility 11/15/2022   SLR R 65 myofascial tension only   SLR L 45 myofascial tension only      JUJU 11/15/2022   R mod   L Min-mod      Diastasis in fingers 12/15/2022   12 cm above umbilicus 1   6 cm above umbilicus 1   At umbilicus 1   6 cm below umbilicus 1-2   12 cm below umbilicus 1 Detail Level: Simple Continue Regimen: Claravis 80 mg alternating between 2 QD, 1 QD Plan: Recommended patient go on a fish oil supplement to help lower his triglycerides. Patient mentioned he is smelling cigarette smoke since he’s been on the medication. He reports not feeling any other symptoms but we’ll continue to monitor this until he’s done with Isotretinoin. Wants to reduce dosage after month 5 to slowly taper off and prevent flares with newer topical meds. Plan: Discussed that there can be many reasons for Parosmia. Discussed that if symptoms of smelling smoke persists after completion of isotretinoin, would recommend a brain CT.

## 2025-07-07 ENCOUNTER — HOSPITAL ENCOUNTER (OUTPATIENT)
Dept: GENERAL RADIOLOGY | Facility: HOSPITAL | Age: 60
Discharge: HOME | End: 2025-07-07
Attending: PHYSICAL MEDICINE & REHABILITATION
Payer: COMMERCIAL

## 2025-07-07 ENCOUNTER — OFFICE VISIT (OUTPATIENT)
Dept: PHYSICAL MEDICINE AND REHAB | Facility: CLINIC | Age: 60
End: 2025-07-07
Payer: COMMERCIAL

## 2025-07-07 VITALS — WEIGHT: 208 LBS | HEIGHT: 66 IN | BODY MASS INDEX: 33.43 KG/M2

## 2025-07-07 DIAGNOSIS — M54.14 THORACIC RADICULOPATHY: ICD-10-CM

## 2025-07-07 DIAGNOSIS — M48.061 SPINAL STENOSIS OF LUMBAR REGION WITHOUT NEUROGENIC CLAUDICATION: ICD-10-CM

## 2025-07-07 DIAGNOSIS — M54.16 LUMBAR RADICULOPATHY: ICD-10-CM

## 2025-07-07 DIAGNOSIS — E11.42 DIABETIC POLYNEUROPATHY ASSOCIATED WITH TYPE 2 DIABETES MELLITUS (HCC): ICD-10-CM

## 2025-07-07 DIAGNOSIS — M25.561 CHRONIC PAIN OF RIGHT KNEE: ICD-10-CM

## 2025-07-07 DIAGNOSIS — G89.29 CHRONIC PAIN OF RIGHT KNEE: ICD-10-CM

## 2025-07-07 DIAGNOSIS — M51.24 HERNIATED THORACIC DISC WITHOUT MYELOPATHY: Primary | ICD-10-CM

## 2025-07-07 DIAGNOSIS — M54.6 CHRONIC LEFT-SIDED THORACIC BACK PAIN: ICD-10-CM

## 2025-07-07 DIAGNOSIS — G89.29 CHRONIC LEFT-SIDED THORACIC BACK PAIN: ICD-10-CM

## 2025-07-07 DIAGNOSIS — M51.26 LUMBAR HERNIATED DISC: ICD-10-CM

## 2025-07-07 DIAGNOSIS — M51.9 LUMBAR DISC DISEASE: ICD-10-CM

## 2025-07-07 PROCEDURE — 72082 X-RAY EXAM ENTIRE SPI 2/3 VW: CPT | Performed by: PHYSICAL MEDICINE & REHABILITATION

## 2025-07-07 PROCEDURE — 72110 X-RAY EXAM L-2 SPINE 4/>VWS: CPT | Performed by: RADIOLOGY

## 2025-07-07 NOTE — PROGRESS NOTES
Low Back Pain H & P    Chief Complaint:   Chief Complaint   Patient presents with    Follow - Up     LOV 3/10/25 pt is here for a follow up on back pain. Also has new complaints of right hip pain that radiates into knee.  Reports n/t in LLE. Takes meloxicam and Pepcid. No current physical therapy. Pain comes when being active and moving .      Nursing note reviewed and verified.    History of Present Illness  Lluvia Gonzales is a 60 year old female with a history of falls and lower back pain who presents with worsening back pain radiating to the right leg.    She has experienced a series of falls beginning in May, initially slipping on salad dressing and landing on her left back, resulting in mild central lower back pain. The pain was aggravated by lifting heavy items for her sister's fundraising event.    A subsequent fall occurred when she tripped on a bathroom mat, landing on her left low back, leading to pain primarily in the left low back, which later migrated back to the center. Recently, she has experienced lower back pain radiating to the right side, particularly at the belt line, and extending to the right knee.    The pain is described as dull, with episodes of sharp pain when standing or walking. It is exacerbated by activities such as standing, walking, and carrying items, with pain levels reaching up to 8 out of 10 during these activities. Sitting and lying down relieve the pain, with no pain reported at rest.    She has been using Advil 600 mg in the morning and Salonpas, which provided some relief. More recently, she was prescribed meloxicam 15 mg, which improved her ability to get out of bed without pain. However, after stopping the medication, she experienced severe lower back pain with radiation to the right hip and knee.    She reports numbness and tingling in both lower extremities, particularly in the calves, described as a 'numbness, tingling' sensation. The numbness is more pronounced  on the back of the legs. The pain worsens as the day progresses, particularly with prolonged standing or walking. She experiences difficulty transitioning from sitting to standing, with pain starting at a 3 or 4 out of 10. The pain is equally bad when going up or down stairs.    Her past medical history includes diabetes and a history of herniated discs with moderate spinal stenosis. She suspects some diabetic neuropathy, although she notes the current symptoms feel different from her usual neuropathy.    Description of the Pain  The pain is located in the right and central low back.    The pain radiates to the right lateral hip and right lateral thigh..  The pain at its best is 0/10. The pain at its worst is 8/10. The pain is currently  0/10.  She will have 5/10 when she is standing.  The pain is described as a(n) dull sensation.    The pain is worse standing, walking, going from sitting to standing, in the afternoon, and in the evening.    The pain is better sitting.  There is numbness in the bilateral posterior lower leg.  There is tingling in the bilateral posterior lower leg.  There is weakness in the right leg.     Past Medical History   Past Medical History[1]    Past Surgical History   Past Surgical History[2]    Family History   Family History[3]    Social History   Social History     Socioeconomic History    Marital status:      Spouse name: Not on file    Number of children: Not on file    Years of education: Not on file    Highest education level: Not on file   Occupational History    Not on file   Tobacco Use    Smoking status: Former     Types: Cigarettes    Smokeless tobacco: Never    Tobacco comments:     30 years ago   Vaping Use    Vaping status: Never Used   Substance and Sexual Activity    Alcohol use: Yes     Alcohol/week: 0.0 standard drinks of alcohol     Comment: occ    Drug use: No    Sexual activity: Not on file   Other Topics Concern     Service Not Asked    Blood  Transfusions Not Asked    Caffeine Concern Yes     Comment: 1 cup of coffee daily    Occupational Exposure Not Asked    Hobby Hazards Not Asked    Sleep Concern Not Asked    Stress Concern Not Asked    Weight Concern Not Asked    Special Diet Not Asked    Back Care Not Asked    Exercise Yes     Comment: walking twice daily    Bike Helmet Not Asked    Seat Belt Not Asked    Self-Exams Not Asked   Social History Narrative    The patient does not use an assistive device..      The patient does live in a home with stairs.     Social Drivers of Health     Food Insecurity: Not on file   Transportation Needs: Not on file   Stress: Not on file   Housing Stability: Not on file       PE:  The patient does appear in her stated age in no distress.  The patient is well groomed.    Psychiatric:  The patient is alert and oriented x 3.  The patient has a normal affect and mood.      Respiratory:  No acute respiratory distress. Patient does not have a cough.    HEENT:  Extraocular muscles are intact. There is no kern icterus. Pupils are equal, round, and reactive to light. No redness or discharge bilaterally.    Skin:  There are no rashes or lesions.    Vitals:  There were no vitals filed for this visit.    Gait:    Gait: right antalgic gait   Sit to Stand: mild-moderate difficulty      Lumbar Spine:    Scoliosis: Thoracic dextroscoliosis that is moderate, Lumbar levoscoliosis that is moderate, and Left shift that is moderate     Lumbar Spine Palpation:    Spinous Processes: Non-tender for all Spinous Processes   Z-joints: Non-tender for all Z-joints   SIJ: Non-tender for bilateral SIJ   Piriformis Muscle: Non-tender bilateral Piriformis muscles   Greater Trochanteric Bursa: Non-tender for bilateral Greater Trochanteric Bursa     Vascular lower extremity:   Dorsalis pedis pulse-RIGHT 2+   Dorsalis pedis pulse-LEFT 2+   Tibialis posterior pulse-RIGHT 2+   Tibialis posterior pulse-LEFT 2+     Neurological Lower Extremity:    Light  Touch Sensation: Intact in bilateral LE except:  Decreased in the  arch of the RIGHT foot  arch of the LEFT foot  lateral RIGHT foot  lateral LEFT foot  first dorsal web space of the RIGHT foot  first dorsal web space of the LEFT foot   LE Muscle Strength: All LE strength measurements 5/5 except:  Hamstring RIGHT:   3+/5  Hamstring LEFT:   3+/5  Extensor Hallucis Longus RIGHT:   3+/5  Extensor Hallucis Longus LEFT:   3+/5   RIGHT plantar reflexes: downward response   LEFT plantar reflexes: downward response   Reflexes: 2+ in bilateral lower extremities except:  Right Achilles tendon which are absent  Left Achilles tendon which are absent     Assessment  1. T6-7 right mild-mod HNP, T7-8 mild central HNP, T10-11 left paracentral mild HNP    2. left T10 radiculopathy    3. L4-5 mod central stenosis    4. L4-5 mod central HNP, L5-S1 right paracentral & central mild -mod HNP    5. L3-4 mild diffuse bulging disc    6. bilateral L5-S1 radiculopathies    7. Chronic left-sided thoracic back pain    8. Chronic pain of right knee         Plan  Assessment & Plan  Central low back pain with radiation to right hip and knee  Chronic low back pain with recent exacerbation, radiating to right hip and knee, with weakness and numbness. Possible worsening of herniated discs and spinal stenosis.  - Order MRI of lumbar spine to assess changes in herniated discs and spinal stenosis.  - Order scoliosis x-ray and lumbar spine x-ray with flexion and extension views to assess alignment and stability.  - Restart meloxicam 15 mg daily with Pepcid for gastrointestinal protection for up to one month.  - Initiate physical therapy after imaging results are available.    Bilateral lower extremity weakness and numbness  Weakness and numbness in lower extremities, possibly related to spinal pathology or diabetic neuropathy. More pronounced on right side.  - Evaluate with MRI and x-ray to determine cause of weakness and numbness.    Diabetic  neuropathy  Diabetic neuropathy with numbness and tingling in lower extremities, different from usual presentation.    Orders Placed This Encounter   Procedures    XR LUMBAR SPINE (MIN 4 VIEWS) (CPT=72110)     Standing AP, lateral, Flexion, and Extension views     Standing Status:   Future     Expected Date:   7/7/2025     Expiration Date:   7/7/2026     Scheduling Instructions:      Your order will generate a \"Scheduling Ticket\" that will be available in [employer] to schedule on your own at a time most convenient to you.              If you do not have a [employer] Account, or if you prefer to speak with someone to schedule your appointment, please call [employer] Scheduling at [phone].          XR SCOLIOSIS SPINE 2-3 VIEWS (CPT=72082)     Standing Status:   Future     Expected Date:   7/7/2025     Expiration Date:   7/7/2026     Scheduling Instructions:      Your order will generate a \"Scheduling Ticket\" that will be available in [employer] to schedule on your own at a time most convenient to you.              If you do not have a [employer] Account, or if you prefer to speak with someone to schedule your appointment, please call [employer] Scheduling at [phone].          MRI SPINE LUMBAR (CPT=72148)     Standing Status:   Future     Expected Date:   7/7/2025     Expiration Date:   7/7/2026     Scheduling Instructions:      [facility] refers patients to have laboratory, x-ray, and various other diagnostic tests done at [facility], however your insurance company may require you to have these tests done at another facility OR obtain a prior authorization number prior to the exam. It is       the patient's responsibility to check with and follow their insurance company's guidelines. If your insurance company requires a prior authorization for a diagnostic test (such as MRI, MRA, CT, or Ultrasound), please do not schedule your exam until you have received the  authorization number. You       may be held responsible for payment in full if proper authorization is not acquired. Thank You.            To schedule a test at any [facility] Facility call [facility] Scheduling at       [phone].      Return in about 4 weeks (around 8/4/2025) for reviewing imaging.     Patient should call prior to next visit if new or worsening symptoms.     The patient understands and agrees with the stated plan.  Andrea Blair MD  7/7/2025           [1]   Past Medical History:   Diabetes (HCC)    Essential hypertension    Hyperlipidemia   [2] History reviewed. No pertinent surgical history.  [3]   Family History  Problem Relation Age of Onset    Stroke Father     Cancer Father         throat cancer    Breast Cancer Mother     Diabetes Mother

## 2025-07-09 ENCOUNTER — HOSPITAL ENCOUNTER (OUTPATIENT)
Dept: MRI IMAGING | Facility: HOSPITAL | Age: 60
Discharge: HOME | End: 2025-07-09
Attending: PHYSICAL MEDICINE & REHABILITATION
Payer: COMMERCIAL

## 2025-07-09 DIAGNOSIS — M54.16 LUMBAR RADICULOPATHY: ICD-10-CM

## 2025-07-09 PROCEDURE — 72148 MRI LUMBAR SPINE W/O DYE: CPT | Performed by: PHYSICAL MEDICINE & REHABILITATION

## 2025-07-10 ENCOUNTER — TELEPHONE (OUTPATIENT)
Dept: PHYSICAL MEDICINE AND REHAB | Facility: CLINIC | Age: 60
End: 2025-07-10

## 2025-07-10 NOTE — TELEPHONE ENCOUNTER
Pt called to inform clinical staff she had MRI completed yesterday, 79/25, and is calling to schedule a follow up with Dr. Blair to review the results. Please advise, thank you.

## 2025-07-15 ENCOUNTER — TELEPHONE (OUTPATIENT)
Dept: PHYSICAL MEDICINE AND REHAB | Facility: CLINIC | Age: 60
End: 2025-07-15

## 2025-07-15 DIAGNOSIS — S32.020D COMPRESSION FRACTURE OF L2 VERTEBRA WITH ROUTINE HEALING: ICD-10-CM

## 2025-07-15 DIAGNOSIS — M43.10 RETROLISTHESIS: ICD-10-CM

## 2025-07-15 DIAGNOSIS — S32.030D COMPRESSION FRACTURE OF L3 VERTEBRA WITH ROUTINE HEALING: ICD-10-CM

## 2025-07-15 DIAGNOSIS — M48.061 LUMBAR FORAMINAL STENOSIS: ICD-10-CM

## 2025-07-15 DIAGNOSIS — M54.16 LUMBAR RADICULOPATHY: Primary | ICD-10-CM

## 2025-07-15 DIAGNOSIS — M51.9 LUMBAR DISC DISEASE: ICD-10-CM

## 2025-07-15 DIAGNOSIS — M48.061 SPINAL STENOSIS OF LUMBAR REGION WITHOUT NEUROGENIC CLAUDICATION: ICD-10-CM

## 2025-07-15 DIAGNOSIS — M51.26 LUMBAR HERNIATED DISC: ICD-10-CM

## 2025-07-15 NOTE — TELEPHONE ENCOUNTER
I called the patient and gave her the results of her MRI scan.  She will get into the PT with Naida again.

## 2025-07-18 ENCOUNTER — OFFICE VISIT (OUTPATIENT)
Dept: ENDOCRINOLOGY CLINIC | Facility: CLINIC | Age: 60
End: 2025-07-18
Payer: COMMERCIAL

## 2025-07-18 VITALS
WEIGHT: 224 LBS | HEIGHT: 66 IN | DIASTOLIC BLOOD PRESSURE: 77 MMHG | SYSTOLIC BLOOD PRESSURE: 128 MMHG | BODY MASS INDEX: 36 KG/M2 | HEART RATE: 88 BPM

## 2025-07-18 DIAGNOSIS — M81.0 AGE-RELATED OSTEOPOROSIS WITHOUT CURRENT PATHOLOGICAL FRACTURE: ICD-10-CM

## 2025-07-18 DIAGNOSIS — E11.65 UNCONTROLLED TYPE 2 DIABETES MELLITUS WITH HYPERGLYCEMIA (HCC): Primary | ICD-10-CM

## 2025-07-18 LAB
GLUCOSE BLOOD: 221
HEMOGLOBIN A1C: 8.4 % (ref 4.3–5.6)
TEST STRIP LOT #: NORMAL NUMERIC

## 2025-07-18 PROCEDURE — 82947 ASSAY GLUCOSE BLOOD QUANT: CPT | Performed by: INTERNAL MEDICINE

## 2025-07-18 PROCEDURE — 99204 OFFICE O/P NEW MOD 45 MIN: CPT | Performed by: INTERNAL MEDICINE

## 2025-07-18 PROCEDURE — 3052F HG A1C>EQUAL 8.0%<EQUAL 9.0%: CPT | Performed by: INTERNAL MEDICINE

## 2025-07-18 PROCEDURE — 3074F SYST BP LT 130 MM HG: CPT | Performed by: INTERNAL MEDICINE

## 2025-07-18 PROCEDURE — 83036 HEMOGLOBIN GLYCOSYLATED A1C: CPT | Performed by: INTERNAL MEDICINE

## 2025-07-18 PROCEDURE — 3078F DIAST BP <80 MM HG: CPT | Performed by: INTERNAL MEDICINE

## 2025-07-18 PROCEDURE — 3008F BODY MASS INDEX DOCD: CPT | Performed by: INTERNAL MEDICINE

## 2025-07-18 RX ORDER — TIRZEPATIDE 5 MG/.5ML
5 INJECTION, SOLUTION SUBCUTANEOUS WEEKLY
Qty: 6 ML | Refills: 1 | Status: SHIPPED | OUTPATIENT
Start: 2025-07-18

## 2025-07-18 RX ORDER — GLIMEPIRIDE 2 MG/1
2 TABLET ORAL
Qty: 90 TABLET | Refills: 1 | Status: SHIPPED | OUTPATIENT
Start: 2025-07-18

## 2025-07-18 RX ORDER — HYDROCHLOROTHIAZIDE 12.5 MG/1
1 CAPSULE ORAL
Qty: 6 EACH | Refills: 1 | Status: SHIPPED | OUTPATIENT
Start: 2025-07-18

## 2025-07-18 RX ORDER — DAPAGLIFLOZIN 10 MG/1
10 TABLET, FILM COATED ORAL DAILY
Qty: 90 TABLET | Refills: 1 | Status: SHIPPED | OUTPATIENT
Start: 2025-07-18 | End: 2026-01-14

## 2025-07-18 NOTE — PATIENT INSTRUCTIONS
CONTINUE Toujeo 70 units subcutaneous daily    STOP Actos or Pioglitazone    START Glimepiride 2mg daily    INCREASE Farxiga 10mg daily     CONTINUE Mounjaro 5mg subcutaneous weekly

## 2025-07-18 NOTE — PROGRESS NOTES
Sample continuous glucose monitor, Yessy 3 plus, placed on patient's arm. Patient understands guidelines and usage of sensor. Patient's sensor connected to clinic and aware of follow up.

## 2025-07-18 NOTE — PROGRESS NOTES
Name: Lluvia Gonzales  Date: 2025    Referring Physician: No ref. provider found    HISTORY OF PRESENT ILLNESS   Lluvia Gonzales is a 60 year old female who presents for diabetes mellitus, diagnosed 20 years ago.     Prior HbA, C or glycohemoglobin were 8.4% POC Today.  Dietary compliance: Fair; some cheating   Exercise: No, decreased due to recent compression fractures.   Polyuria/polydipsia: No  Blurred vision: No    Episodes of hypoglycemia: No  Blood Glucose:  Checking 1-2 times per day  Fastin-130     Medications for DM   Farxiga 5mg PO daily   Mounjaro 5mg subcutaneous weekly   Toujeo 70 units subcutaneous daily   Pioglitazone 45mg PO daily       REVIEW OF SYSTEMS  The ROS was updated during office visit 2025 and updates noted below and in the HPI.     Eyes: Diabetic retinopathy present: Yes, retina specialist once per year            Most recent visit to eye doctor in last 12 months: Yes    CV: Cardiovascular disease present: No         Hypertension present: Yes         Hyperlipidemia present: Yes, on statin          Peripheral Vascular Disease present: No    : Nephropathy present: No    Neuro: Neuropathy present: Yes    Skin: Infection or ulceration: No    Osteoporosis: Yes, new compression fractures after fall in 2025 in the kitchen and secondary fall in Bathroom.  Compression fracture L3, severe disc disease L4-L5.     Thyroid disease: No      Medications:   Medications - Current[1]     Allergies:   Allergies[2]    Social History:   Social Hx on file[3]    Medical History:   Past Medical History[4]    Surgical history:   Past Surgical History[5]      PHYSICAL EXAM  /77   Pulse 88   Ht 5' 6\" (1.676 m)   Wt 224 lb (101.6 kg)   BMI 36.15 kg/m²     General Appearance:  alert, well developed, in no acute distress  Eyes:  normal conjunctivae, sclera., normal sclera and normal pupils  Ears/Nose/Mouth/Throat/Neck:  no palpable thyroid nodules or cervical  lymphadenopathy  Musculoskeletal:  normal muscle strength and tone  Skin:  normal moisture and skin texture  Hair & Nails:  normal scalp hair     Hematologic:  no excessive bruising  Neuro:  sensory grossly intact and motor grossly intact  Psychiatric:  oriented to time, self, and place  Nutritional:  no abnormal weight gain or loss  Bilateral barefoot skin diabetic exam is normal, visualized feet and the appearance is normal.  Bilateral monofilament/sensation of both feet is normal.  Pulsation pedal pulse exam of both lower legs/feet is normal as well.    ASSESSMENT/PLAN:      Diabetes Mellitus Type 2, Uncontrolled  -Uncontrolled  -Discussed importance of glycemic control to prevent complications of diabetes  -Discussed complications of diabetes include retinopathy, neuropathy, nephropathy and cardiovascular disease  -Discussed ABCs of DM  -Discussed importance of SBGM  -Discussed importance of low CHO diet, recommend 45gm per meal or 135gm per day  -Provided patient education materials  -Continue Toujeo 70 units subcutaneous daily   -Discontinue pioglitazone  -Increase Farxiga to 10mg PO daily, verbalized understanding of risks and benefits  -Continue Mounjaro (just restarted this week)   -Start glimepiride 2mg PO daily, verbalized understanding of risks and benefits  -Start Libre3 sensor during OV   -Normotensive  -Normal foot exam performed today  -UTD with optho     2. Fragility Fracture  - Check DEXA     RTC 4 months     7/18/2025  Nakia Minaya MD               [1]   Current Outpatient Medications:     atorvastatin 40 MG Oral Tab, Take 40 mg by mouth daily., Disp: , Rfl:     FARXIGA 5 MG Oral Tab, , Disp: , Rfl:     dilTIAZem HCl  MG Oral Capsule SR 24 Hr, Take 240 mg by mouth nightly. TAKE AT BEDTIME, Disp: , Rfl:     TOUJEO SOLOSTAR 300 UNIT/ML Subcutaneous Solution Pen-injector, , Disp: , Rfl:     Pioglitazone HCl 30 MG Oral Tab, 1 tablet. daily, Disp: , Rfl: 99    BD PEN NEEDLE JEAN-PAUL 2ND GEN 32G X 4  MM Does not apply Misc, use with pre filled insulin pen as directed once a day, Disp: , Rfl:     Blood Glucose Monitoring Suppl (ONETOUCH VERIO) w/Device Does not apply Kit, , Disp: , Rfl:     Glucose Blood (ONETOUCH VERIO) In Vitro Strip, , Disp: , Rfl:     ONETOUCH DELICA LANCETS 33G Does not apply Misc, , Disp: , Rfl:     SITagliptin-metFORMIN HCl  MG Oral Tab, Take 1 tablet by mouth 2 (two) times daily with meals., Disp: , Rfl:   [2] No Known Allergies  [3]   Social History  Socioeconomic History    Marital status:    Tobacco Use    Smoking status: Former     Types: Cigarettes    Smokeless tobacco: Never    Tobacco comments:     30 years ago   Vaping Use    Vaping status: Never Used   Substance and Sexual Activity    Alcohol use: Yes     Alcohol/week: 0.0 standard drinks of alcohol     Comment: occ    Drug use: No   Other Topics Concern    Caffeine Concern Yes     Comment: 1 cup of coffee daily    Exercise Yes     Comment: walking twice daily   [4]   Past Medical History:   Diabetes (HCC)    Essential hypertension    Hyperlipidemia   [5] No past surgical history on file.

## 2025-07-24 ENCOUNTER — TELEPHONE (OUTPATIENT)
Dept: ENDOCRINOLOGY CLINIC | Facility: CLINIC | Age: 60
End: 2025-07-24

## 2025-07-24 NOTE — TELEPHONE ENCOUNTER
Received fax from [facility] dated on 7/22/25 attached is pt lab results collected on  3/21/25 results placed in provider folder for review.

## 2025-08-04 ENCOUNTER — OFFICE VISIT (OUTPATIENT)
Dept: PHYSICAL MEDICINE AND REHAB | Facility: CLINIC | Age: 60
End: 2025-08-04

## 2025-08-04 ENCOUNTER — TELEPHONE (OUTPATIENT)
Dept: PHYSICAL THERAPY | Facility: HOSPITAL | Age: 60
End: 2025-08-04

## 2025-08-04 VITALS — WEIGHT: 224 LBS | HEIGHT: 66 IN | BODY MASS INDEX: 36 KG/M2

## 2025-08-04 DIAGNOSIS — M54.16 LUMBAR RADICULOPATHY: ICD-10-CM

## 2025-08-04 DIAGNOSIS — M51.9 LUMBAR DISC DISEASE: ICD-10-CM

## 2025-08-04 DIAGNOSIS — M48.061 SPINAL STENOSIS OF LUMBAR REGION WITHOUT NEUROGENIC CLAUDICATION: Primary | ICD-10-CM

## 2025-08-04 DIAGNOSIS — E11.42 DIABETIC POLYNEUROPATHY ASSOCIATED WITH TYPE 2 DIABETES MELLITUS (HCC): ICD-10-CM

## 2025-08-04 DIAGNOSIS — M51.24 HERNIATED THORACIC DISC WITHOUT MYELOPATHY: ICD-10-CM

## 2025-08-04 DIAGNOSIS — M51.26 LUMBAR HERNIATED DISC: ICD-10-CM

## 2025-08-04 DIAGNOSIS — M54.14 THORACIC RADICULOPATHY: ICD-10-CM

## 2025-08-04 DIAGNOSIS — M48.061 LUMBAR FORAMINAL STENOSIS: ICD-10-CM

## 2025-08-04 DIAGNOSIS — S32.020D COMPRESSION FRACTURE OF L2 VERTEBRA WITH ROUTINE HEALING: ICD-10-CM

## 2025-08-04 DIAGNOSIS — S32.030D COMPRESSION FRACTURE OF L3 VERTEBRA WITH ROUTINE HEALING: ICD-10-CM

## 2025-08-04 PROCEDURE — 3008F BODY MASS INDEX DOCD: CPT | Performed by: PHYSICAL MEDICINE & REHABILITATION

## 2025-08-04 PROCEDURE — 99214 OFFICE O/P EST MOD 30 MIN: CPT | Performed by: PHYSICAL MEDICINE & REHABILITATION

## 2025-08-04 RX ORDER — CALCITONIN SALMON 200 [IU]/.09ML
1 SPRAY, METERED NASAL DAILY
Qty: 3.7 ML | Refills: 0 | Status: SHIPPED | OUTPATIENT
Start: 2025-08-04

## 2025-08-05 ENCOUNTER — OFFICE VISIT (OUTPATIENT)
Dept: PHYSICAL THERAPY | Facility: HOSPITAL | Age: 60
End: 2025-08-05
Attending: PHYSICAL MEDICINE & REHABILITATION

## 2025-08-05 DIAGNOSIS — M48.061 SPINAL STENOSIS OF LUMBAR REGION WITHOUT NEUROGENIC CLAUDICATION: ICD-10-CM

## 2025-08-05 DIAGNOSIS — S32.020D COMPRESSION FRACTURE OF L2 VERTEBRA WITH ROUTINE HEALING: ICD-10-CM

## 2025-08-05 DIAGNOSIS — M51.9 LUMBAR DISC DISEASE: ICD-10-CM

## 2025-08-05 DIAGNOSIS — M43.10 RETROLISTHESIS: ICD-10-CM

## 2025-08-05 DIAGNOSIS — M51.26 LUMBAR HERNIATED DISC: ICD-10-CM

## 2025-08-05 DIAGNOSIS — M48.061 LUMBAR FORAMINAL STENOSIS: ICD-10-CM

## 2025-08-05 DIAGNOSIS — S32.030D COMPRESSION FRACTURE OF L3 VERTEBRA WITH ROUTINE HEALING: ICD-10-CM

## 2025-08-05 DIAGNOSIS — M54.16 LUMBAR RADICULOPATHY: Primary | ICD-10-CM

## 2025-08-05 PROCEDURE — 97110 THERAPEUTIC EXERCISES: CPT

## 2025-08-05 PROCEDURE — 97162 PT EVAL MOD COMPLEX 30 MIN: CPT

## 2025-08-13 ENCOUNTER — TELEPHONE (OUTPATIENT)
Dept: PHYSICAL MEDICINE AND REHAB | Facility: CLINIC | Age: 60
End: 2025-08-13

## 2025-08-14 ENCOUNTER — TELEPHONE (OUTPATIENT)
Dept: PHYSICAL THERAPY | Facility: HOSPITAL | Age: 60
End: 2025-08-14

## 2025-08-19 ENCOUNTER — OFFICE VISIT (OUTPATIENT)
Dept: PHYSICAL THERAPY | Facility: HOSPITAL | Age: 60
End: 2025-08-19
Attending: PHYSICAL MEDICINE & REHABILITATION

## 2025-08-19 PROCEDURE — 97530 THERAPEUTIC ACTIVITIES: CPT

## 2025-08-19 PROCEDURE — 97112 NEUROMUSCULAR REEDUCATION: CPT

## 2025-08-19 PROCEDURE — 97110 THERAPEUTIC EXERCISES: CPT

## 2025-08-22 ENCOUNTER — TELEPHONE (OUTPATIENT)
Dept: ENDOCRINOLOGY CLINIC | Facility: CLINIC | Age: 60
End: 2025-08-22

## 2025-08-26 ENCOUNTER — MED REC SCAN ONLY (OUTPATIENT)
Dept: PHYSICAL MEDICINE AND REHAB | Facility: CLINIC | Age: 60
End: 2025-08-26

## 2025-08-26 ENCOUNTER — OFFICE VISIT (OUTPATIENT)
Dept: PHYSICAL THERAPY | Facility: HOSPITAL | Age: 60
End: 2025-08-26
Attending: PHYSICAL MEDICINE & REHABILITATION

## 2025-08-26 PROCEDURE — 97110 THERAPEUTIC EXERCISES: CPT

## 2025-08-26 PROCEDURE — 97530 THERAPEUTIC ACTIVITIES: CPT

## 2025-08-26 PROCEDURE — 97112 NEUROMUSCULAR REEDUCATION: CPT

## (undated) NOTE — LETTER
8/29/2018      Jonathan Blair MD  Physical Medicine and Rehabilitation  [address] [address]  Dept: [phone]  Dept Fax: [phone]        RE: Consultation for Lenora Verduzco        Dear Gila Power,    Thank kuldip

## (undated) NOTE — LETTER
1/21/2019      Traci Blair MD  Physical Medicine and Rehabilitation  [address] [address]  Dept: [phone]  Dept Fax: [phone]        RE: Consultation for Ally Smith        Dear Brandon Osmani,    Thank y

## (undated) NOTE — LETTER
Patient Name: Lluvia Gonzales  YOB: 1965          MRN number:  [id number]  Date:  8/2/2024  Referring Physician:  Preston Finch         SPINE EVALUATION:     Diagnosis:   Acute left-sided low back pain with left-sided sciatica (M54.42)       Referring Provider: Preston Finch  Date of Evaluation:    8/2/2024    Precautions:  None Next MD visit:   none scheduled  Date of Surgery: n/a     PATIENT SUMMARY   Lluvia Gonzales is a 59 year old female who presents to therapy today with complaints of L sided low back with radiating pain down towards her posterior ankle that occurred ~8 weeks ago without MARGARITA. She also has N/T in her posterior ankle. No recent imaging was performed. Ambulating makes her pain worse while resting and laying down makes her pain better. She is currently taking Tylenol for pain relief.   Pt describes pain level current 4/10, at best 0/10, at worst 8/10.   Current functional limitations include difficulty ambulating, squatting, and performing transfers.     Lluvia describes prior level of function as independent. Pt goals include ambulating, squatting, and performing transfers with less pain and difficulty.   Past medical history was reviewed with Lluvia. She has a past medical history of Diabetes (HCC), Essential hypertension, and Hyperlipidemia.   Pt denies diplopia, dysarthria, dysphasia, dizziness, drop attacks, bowel/bladder changes, and saddle anesthesia.   ASSESSMENT  Lluvia presents to physical therapy evaluation with primary c/o L sided low back with radiating pain down towards her posterior ankle. The results of the objective tests and measures show decreased thoracolumbar spine side-bending and rotation ROM, decreased L hip ABD and ER strength, and impaired posture and balance. Functional deficits include but are not limited to difficulty ambulating, squatting, and performing transfers. Signs and symptoms are consistent with diagnosis of  left-sided low back pain with left-sided sciatica. Pt and PT discussed evaluation findings, pathology, POC and HEP.  Pt voiced understanding and performs HEP correctly without reported pain. Skilled Physical Therapy is medically necessary to address the above impairments and reach functional goals.     OBJECTIVE:   Observation/Posture: FHP & Slump sitting   Neuro Screen: Bilat. Light touch is intact     Thoracolumbar AROM: (* denotes performed with pain)  Flexion: 80  Extension: 25  Sidebending: R 25*; L 25  Rotation: R 50*; L 50    Accessory motion: Hypomobility with central PA glides in the lumbar spine (L1-L5)   Palpation: TTP in the L lumbar paraspinals     Strength: (* denotes performed with pain)  LE   Hip flexion (L2): R 5/5; L 4/5  Hip abduction: R 4/5; L 3/5  Hip Extension: R 4/5; L 3/5   Hip ER: R 4/5; L 3/5  Hip IR: R 5/5; L 5/5  Knee Flexion: R 5/5; L 5/5   Knee extension (L3): R 5/5; L 5/5   DF (L4): R 5/5; L 5/5  PF (S1): R 4/5; L 3/5     Special tests:   SLR & Slump: positive     Gait: pt ambulates on level ground with decreased stance phase in the LLE .  Balance: SLS R >30s, L 10s    Today’s Treatment and Response:   Pt education was provided on exam findings, treatment diagnosis, treatment plan, expectations, and prognosis. Pt was also provided recommendations for activity modifications  Patient was instructed in and issued a HEP for: SKTC, piriformis stretch, & bridges   Therapeutic exercises:   SKTC 3x30s ea bilat  Piriformis stretch 3x30s ea bilat  Bridges 3x10   Manual treatment: STM in bilateral lumbar paraspinals & central PA glides in the lumbar spine (L1-L5) (Grade I-II)    Charges: PT Eval Low Complexity, 1 Therapeutic exercises 1 Manual treatment     Total Timed Treatment: 30 min     Total Treatment Time: 45 min     Based on clinical rationale and outcome measures, this evaluation involved Low Complexity decision making due to 1-2 personal factors/comorbidities, 3 body structures  involved/activity limitations, and evolving symptoms including changing pain levels.  PLAN OF CARE:        Frequency / Duration: Patient will be seen for 2 x/week or a total of 8 visits over a 90 day period. Treatment will include: Gait training, Manual Therapy, Neuromuscular Re-education, Therapeutic Activities, Therapeutic Exercise, and Home Exercise Program instruction    Education or treatment limitation: None  Rehab Potential:good    LEFS Score  LEFS Score: 68.75 % (8/2/2024  1:44 PM)    Patient/Family/Caregiver was advised of these findings, precautions, and treatment options and has agreed to actively participate in planning and for this course of care.    Thank you for your referral. Please co-sign or sign and return this letter via fax as soon as possible to [phone]. If you have any questions, please contact me at Dept: [phone]    Sincerely,  Electronically signed by therapist: Elliott Haskins PT    Physician's certification required: Yes  I certify the need for these services furnished under this plan of treatment and while under my care.    X___________________________________________________ Date____________________    Certification From: 8/2/2024  To:10/31/2024      21st Century Cures Act Notice to Patient: Medical documents like this are made available to patients in the interest of transparency. However, be advised this is a medical document and it is intended as veny-uo-focp communication between your medical providers. This medical document may contain abbreviations, assessments, medical data, and results or other terms that are unfamiliar. Medical documents are intended to carry relevant information, facts as evident, and the clinical opinion of the practitioner. As such, this medical document may be written in language that appears blunt or direct. You are encouraged to contact your medical provider and/or [facility] Patient Experience if you have any questions about this medical  document.

## (undated) NOTE — LETTER
7/7/2025      Andrea Blair MD  Physical Medicine and Rehabilitation  [address] [place] [address], [address]  [facility] [address]  Dept: [phone]  Dept Fax: [phone]        RE: Consultation for Lluvia Gonzales        Dear KEVIN GONCALVES,    Thank you very much for the opportunity to see your patient.  Attached please find a summary from your patient's recent visit.     I appreciate the chance to take care of your patient with you.  Please feel free to call me with any questions or concerns.    Sincerely,        Andrea Blair MD  Electronically Signed on 7/7/2025

## (undated) NOTE — LETTER
9/22/2022      Terrence Blair MD  Physical Medicine and Rehabilitation  [address] [facility], [address] [place] [address]  Dept: [phone]  Dept Fax: [phone]        RE: Consultation for Baldo Goodman        Dear Adolph Phalen,    Thank you very much for the opportunity to see your patient. Attached please find a summary from your patient's recent visit. I appreciate the chance to take care of your patient with you. Please feel free to call me with any questions or concerns. Sincerely,        Eliot Singh.  Anne-Marie Boudreaux MD  Electronically Signed on 9/22/2022

## (undated) NOTE — LETTER
11/25/2024      Andrea Blair MD  Physical Medicine and Rehabilitation  [address] [place] [address], [address]  [facility] [address]  Dept: [phone]  Dept Fax: [phone]        RE: Consultation for Lluvia Gonzales        Dear KEVIN GONCALVES,    Thank you very much for the opportunity to see your patient.  Attached please find a summary from your patient's recent visit.     I appreciate the chance to take care of your patient with you.  Please feel free to call me with any questions or concerns.    Sincerely,        Andrea Blair MD  Electronically Signed on 11/25/2024

## (undated) NOTE — LETTER
3/10/2025      Andrea Blair MD  Physical Medicine and Rehabilitation  [address] [place] [address], [address]  [facility] [address]  Dept: [phone]  Dept Fax: [phone]        RE: Consultation for Lluvia Gonzales        Dear KEVIN GONCALVES,    Thank you very much for the opportunity to see your patient.  Attached please find a summary from your patient's recent visit.     I appreciate the chance to take care of your patient with you.  Please feel free to call me with any questions or concerns.    Sincerely,        Andrea Blair MD  Electronically Signed on 3/10/2025